# Patient Record
Sex: FEMALE | Race: OTHER | URBAN - METROPOLITAN AREA
[De-identification: names, ages, dates, MRNs, and addresses within clinical notes are randomized per-mention and may not be internally consistent; named-entity substitution may affect disease eponyms.]

---

## 2018-09-02 ENCOUNTER — INPATIENT (INPATIENT)
Facility: HOSPITAL | Age: 58
LOS: 2 days | Discharge: NURSING, REHAB OR LONG-TERM CARE | End: 2018-09-05
Attending: HOSPITALIST | Admitting: HOSPITALIST

## 2018-09-02 VITALS
DIASTOLIC BLOOD PRESSURE: 73 MMHG | SYSTOLIC BLOOD PRESSURE: 107 MMHG | RESPIRATION RATE: 17 BRPM | HEART RATE: 71 BPM | TEMPERATURE: 97 F | OXYGEN SATURATION: 99 %

## 2018-09-02 LAB
ALBUMIN SERPL ELPH-MCNC: 4.5 G/DL — SIGNIFICANT CHANGE UP (ref 3.5–5.2)
ALP SERPL-CCNC: 76 U/L — SIGNIFICANT CHANGE UP (ref 30–115)
ALT FLD-CCNC: 12 U/L — SIGNIFICANT CHANGE UP (ref 0–41)
ANION GAP SERPL CALC-SCNC: 16 MMOL/L — HIGH (ref 7–14)
APPEARANCE UR: CLEAR — SIGNIFICANT CHANGE UP
AST SERPL-CCNC: 19 U/L — SIGNIFICANT CHANGE UP (ref 0–41)
BASE EXCESS BLDV CALC-SCNC: 1.9 MMOL/L — SIGNIFICANT CHANGE UP (ref -2–2)
BASOPHILS # BLD AUTO: 0.03 K/UL — SIGNIFICANT CHANGE UP (ref 0–0.2)
BASOPHILS NFR BLD AUTO: 0.4 % — SIGNIFICANT CHANGE UP (ref 0–1)
BILIRUB SERPL-MCNC: 0.3 MG/DL — SIGNIFICANT CHANGE UP (ref 0.2–1.2)
BILIRUB UR-MCNC: NEGATIVE — SIGNIFICANT CHANGE UP
BUN SERPL-MCNC: 23 MG/DL — HIGH (ref 10–20)
CA-I SERPL-SCNC: 1.26 MMOL/L — SIGNIFICANT CHANGE UP (ref 1.12–1.3)
CALCIUM SERPL-MCNC: 9.7 MG/DL — SIGNIFICANT CHANGE UP (ref 8.5–10.1)
CHLORIDE SERPL-SCNC: 99 MMOL/L — SIGNIFICANT CHANGE UP (ref 98–110)
CO2 SERPL-SCNC: 27 MMOL/L — SIGNIFICANT CHANGE UP (ref 17–32)
COLOR SPEC: YELLOW — SIGNIFICANT CHANGE UP
CREAT SERPL-MCNC: 0.7 MG/DL — SIGNIFICANT CHANGE UP (ref 0.7–1.5)
DIFF PNL FLD: NEGATIVE — SIGNIFICANT CHANGE UP
EOSINOPHIL # BLD AUTO: 0.2 K/UL — SIGNIFICANT CHANGE UP (ref 0–0.7)
EOSINOPHIL NFR BLD AUTO: 3 % — SIGNIFICANT CHANGE UP (ref 0–8)
GAS PNL BLDV: 142 MMOL/L — SIGNIFICANT CHANGE UP (ref 136–145)
GAS PNL BLDV: SIGNIFICANT CHANGE UP
GLUCOSE SERPL-MCNC: 99 MG/DL — SIGNIFICANT CHANGE UP (ref 70–99)
GLUCOSE UR QL: NEGATIVE MG/DL — SIGNIFICANT CHANGE UP
HCO3 BLDV-SCNC: 28 MMOL/L — SIGNIFICANT CHANGE UP (ref 22–29)
HCT VFR BLD CALC: 33.6 % — LOW (ref 37–47)
HCT VFR BLDA CALC: 35.3 % — SIGNIFICANT CHANGE UP (ref 34–44)
HGB BLD CALC-MCNC: 11.5 G/DL — LOW (ref 14–18)
HGB BLD-MCNC: 12 G/DL — SIGNIFICANT CHANGE UP (ref 12–16)
IMM GRANULOCYTES NFR BLD AUTO: 0.3 % — SIGNIFICANT CHANGE UP (ref 0.1–0.3)
KETONES UR-MCNC: NEGATIVE — SIGNIFICANT CHANGE UP
LACTATE BLDV-MCNC: 0.6 MMOL/L — SIGNIFICANT CHANGE UP (ref 0.5–1.6)
LEUKOCYTE ESTERASE UR-ACNC: NEGATIVE — SIGNIFICANT CHANGE UP
LYMPHOCYTES # BLD AUTO: 2.46 K/UL — SIGNIFICANT CHANGE UP (ref 1.2–3.4)
LYMPHOCYTES # BLD AUTO: 36.6 % — SIGNIFICANT CHANGE UP (ref 20.5–51.1)
MAGNESIUM SERPL-MCNC: 1.7 MG/DL — LOW (ref 1.8–2.4)
MCHC RBC-ENTMCNC: 31.1 PG — HIGH (ref 27–31)
MCHC RBC-ENTMCNC: 35.7 G/DL — SIGNIFICANT CHANGE UP (ref 32–37)
MCV RBC AUTO: 87 FL — SIGNIFICANT CHANGE UP (ref 81–99)
MONOCYTES # BLD AUTO: 0.47 K/UL — SIGNIFICANT CHANGE UP (ref 0.1–0.6)
MONOCYTES NFR BLD AUTO: 7 % — SIGNIFICANT CHANGE UP (ref 1.7–9.3)
NEUTROPHILS # BLD AUTO: 3.55 K/UL — SIGNIFICANT CHANGE UP (ref 1.4–6.5)
NEUTROPHILS NFR BLD AUTO: 52.7 % — SIGNIFICANT CHANGE UP (ref 42.2–75.2)
NITRITE UR-MCNC: NEGATIVE — SIGNIFICANT CHANGE UP
NRBC # BLD: 0 /100 WBCS — SIGNIFICANT CHANGE UP (ref 0–0)
PCO2 BLDV: 47 MMHG — SIGNIFICANT CHANGE UP (ref 41–51)
PH BLDV: 7.38 — SIGNIFICANT CHANGE UP (ref 7.26–7.43)
PH UR: 6 — SIGNIFICANT CHANGE UP (ref 5–8)
PLATELET # BLD AUTO: 232 K/UL — SIGNIFICANT CHANGE UP (ref 130–400)
PO2 BLDV: 33 MMHG — SIGNIFICANT CHANGE UP (ref 20–40)
POTASSIUM BLDV-SCNC: 2.7 MMOL/L — LOW (ref 3.3–5.6)
POTASSIUM SERPL-MCNC: 2.6 MMOL/L — CRITICAL LOW (ref 3.5–5)
POTASSIUM SERPL-SCNC: 2.6 MMOL/L — CRITICAL LOW (ref 3.5–5)
PROT SERPL-MCNC: 7 G/DL — SIGNIFICANT CHANGE UP (ref 6–8)
PROT UR-MCNC: NEGATIVE MG/DL — SIGNIFICANT CHANGE UP
RBC # BLD: 3.86 M/UL — LOW (ref 4.2–5.4)
RBC # FLD: 12.3 % — SIGNIFICANT CHANGE UP (ref 11.5–14.5)
SAO2 % BLDV: 61 % — SIGNIFICANT CHANGE UP
SODIUM SERPL-SCNC: 142 MMOL/L — SIGNIFICANT CHANGE UP (ref 135–146)
SP GR SPEC: 1.02 — SIGNIFICANT CHANGE UP (ref 1.01–1.03)
TROPONIN T SERPL-MCNC: <0.01 NG/ML — SIGNIFICANT CHANGE UP
UROBILINOGEN FLD QL: 0.2 MG/DL — SIGNIFICANT CHANGE UP (ref 0.2–0.2)
WBC # BLD: 6.73 K/UL — SIGNIFICANT CHANGE UP (ref 4.8–10.8)
WBC # FLD AUTO: 6.73 K/UL — SIGNIFICANT CHANGE UP (ref 4.8–10.8)

## 2018-09-02 RX ORDER — MAGNESIUM SULFATE 500 MG/ML
2 VIAL (ML) INJECTION ONCE
Qty: 0 | Refills: 0 | Status: COMPLETED | OUTPATIENT
Start: 2018-09-02 | End: 2018-09-02

## 2018-09-02 RX ORDER — POTASSIUM CHLORIDE 20 MEQ
40 PACKET (EA) ORAL ONCE
Qty: 0 | Refills: 0 | Status: COMPLETED | OUTPATIENT
Start: 2018-09-02 | End: 2018-09-02

## 2018-09-02 RX ORDER — ASPIRIN/CALCIUM CARB/MAGNESIUM 324 MG
325 TABLET ORAL ONCE
Qty: 0 | Refills: 0 | Status: COMPLETED | OUTPATIENT
Start: 2018-09-02 | End: 2018-09-02

## 2018-09-02 RX ORDER — POTASSIUM CHLORIDE 20 MEQ
20 PACKET (EA) ORAL ONCE
Qty: 0 | Refills: 0 | Status: COMPLETED | OUTPATIENT
Start: 2018-09-02 | End: 2018-09-02

## 2018-09-02 RX ORDER — MAGNESIUM OXIDE 400 MG ORAL TABLET 241.3 MG
400 TABLET ORAL ONCE
Qty: 0 | Refills: 0 | Status: DISCONTINUED | OUTPATIENT
Start: 2018-09-02 | End: 2018-09-02

## 2018-09-02 RX ADMIN — Medication 325 MILLIGRAM(S): at 21:42

## 2018-09-02 RX ADMIN — Medication 40 MILLIEQUIVALENT(S): at 19:56

## 2018-09-02 RX ADMIN — Medication 50 MILLIEQUIVALENT(S): at 21:42

## 2018-09-02 RX ADMIN — Medication 50 GRAM(S): at 21:42

## 2018-09-02 NOTE — ED PROVIDER NOTE - NS ED ROS FT
Constitutional: No fever, chills.  Eyes: No visual changes.  ENT: No hearing changes. No sore throat.  Neck: No neck pain or stiffness.  Cardiovascular: + chest pain, palpitations. No edema.  Pulmonary: No SOB, cough. No hemoptysis.  Abdominal: No abdominal pain, nausea, vomiting, diarrhea.  : No dysuria, frequency.  Neuro: No syncope, dizziness. + headache.  MS: No back pain. No calf pain/swelling.  Psych: No suicidal ideations.

## 2018-09-02 NOTE — ED ADULT NURSE NOTE - NSIMPLEMENTINTERV_GEN_ALL_ED
Implemented All Universal Safety Interventions:  [place] to call system. Call bell, personal items and telephone within reach. Instruct patient to call for assistance. Room bathroom lighting operational. Non-slip footwear when patient is off stretcher. Physically safe environment: no spills, clutter or unnecessary equipment. Stretcher in lowest position, wheels locked, appropriate side rails in place.

## 2018-09-02 NOTE — ED PROVIDER NOTE - OBJECTIVE STATEMENT
Pt is a 57 y/o female with hx of HTN who presents to ED for chest pain and palpitations intermittent over the past several days. + headache that occurs in the morning. No SOB, n/v, diaphoresis.

## 2018-09-02 NOTE — ED PROVIDER NOTE - MEDICAL DECISION MAKING DETAILS
Patient was signed out to me pending repeat potassium level/EKG and further evaluation of chest pain/palpitations, labs noted, repeat potassium on VBG is 2.7, supplementation started, and patient is admitted to Tele. Patient was signed out to me pending repeat potassium level/EKG and further evaluation of chest pain/palpitations, labs noted, repeat potassium on VBG is 2.7, supplementation started, discussed with hosptialist and patient is admitted to Tele.

## 2018-09-02 NOTE — ED ADULT NURSE NOTE - OBJECTIVE STATEMENT
pt came to the ER today walk in with c/o chest pain. denies SOB dizziness or nausea at this time. pt in no distress.

## 2018-09-02 NOTE — ED PROVIDER NOTE - PROGRESS NOTE DETAILS
I personally evaluated the patient. I reviewed the Resident’s note (as assigned above), and agree with the findings and plan except as documented in my note.   59 y/o F Polish speaking, spoke with her in Polish, presents with dizziness, palpitations and CP. Pt notes that she has a HX of HTN and has been having a HA on and off for the past 5 months. Over the last week, pt has been getting very dizzy with sensations of room spinning, also had blurry vision 1 day last week with dizziness. Pt has been having palpitation and CP x1 week. No nausea, vomiting, fever/chills or cough. No trauma. Denies leg pain or swelling. Denies having similar SX in the past. Denies hx of MI or stroke. Pt is a non-smoker. Today tried some aspirin with minimal relief. Pt is also concerned about her anti-HTN medicine, Losartan-HCTZ, since she received a letter from [employer] stating that the compound in her medication was possibly in an adverse reaction and that she needed to get a different medication from the pharmacy. Pt has been taking the same medication and has not switched it since receiving the letter, but has been nervous in doing so. Since her doctor has been away for a month pt has not been able to follow up with him but has an appointment on September 5th . Currently pt is asymptomatic except for palpitations. On exam pt is WA, non-toxic. Head NCAT, EOMI, PERRL, mucosa moist, neck supple. Lungs CTA. S1S2. Abdomen soft NTND. Extremities C/C/E. Neuro- CN II-XII intact, UE and LE strength and sensations intact, no cerebellar signs, normal gait. A/P: Will get labs, cardiac enzymes, head CT and reassess. signed out to Dr. Arevalo, repeat ekg and K at 11pm, if wnl pt can be in obs for r/ o acs

## 2018-09-03 DIAGNOSIS — Z98.82 BREAST IMPLANT STATUS: Chronic | ICD-10-CM

## 2018-09-03 LAB
ANION GAP SERPL CALC-SCNC: 11 MMOL/L — SIGNIFICANT CHANGE UP (ref 7–14)
ANION GAP SERPL CALC-SCNC: 13 MMOL/L — SIGNIFICANT CHANGE UP (ref 7–14)
BUN SERPL-MCNC: 17 MG/DL — SIGNIFICANT CHANGE UP (ref 10–20)
BUN SERPL-MCNC: 23 MG/DL — HIGH (ref 10–20)
CALCIUM SERPL-MCNC: 8.8 MG/DL — SIGNIFICANT CHANGE UP (ref 8.5–10.1)
CALCIUM SERPL-MCNC: 9.3 MG/DL — SIGNIFICANT CHANGE UP (ref 8.5–10.1)
CHLORIDE SERPL-SCNC: 105 MMOL/L — SIGNIFICANT CHANGE UP (ref 98–110)
CHLORIDE SERPL-SCNC: 109 MMOL/L — SIGNIFICANT CHANGE UP (ref 98–110)
CK MB CFR SERPL CALC: 3.1 NG/ML — SIGNIFICANT CHANGE UP (ref 0.6–6.3)
CK SERPL-CCNC: 142 U/L — SIGNIFICANT CHANGE UP (ref 0–225)
CO2 SERPL-SCNC: 22 MMOL/L — SIGNIFICANT CHANGE UP (ref 17–32)
CO2 SERPL-SCNC: 24 MMOL/L — SIGNIFICANT CHANGE UP (ref 17–32)
CREAT SERPL-MCNC: 0.5 MG/DL — LOW (ref 0.7–1.5)
CREAT SERPL-MCNC: 0.6 MG/DL — LOW (ref 0.7–1.5)
GLUCOSE SERPL-MCNC: 84 MG/DL — SIGNIFICANT CHANGE UP (ref 70–99)
GLUCOSE SERPL-MCNC: 93 MG/DL — SIGNIFICANT CHANGE UP (ref 70–99)
MAGNESIUM SERPL-MCNC: 1.9 MG/DL — SIGNIFICANT CHANGE UP (ref 1.8–2.4)
POTASSIUM SERPL-MCNC: 3 MMOL/L — LOW (ref 3.5–5)
POTASSIUM SERPL-MCNC: 3.5 MMOL/L — SIGNIFICANT CHANGE UP (ref 3.5–5)
POTASSIUM SERPL-SCNC: 3 MMOL/L — LOW (ref 3.5–5)
POTASSIUM SERPL-SCNC: 3.5 MMOL/L — SIGNIFICANT CHANGE UP (ref 3.5–5)
SODIUM SERPL-SCNC: 142 MMOL/L — SIGNIFICANT CHANGE UP (ref 135–146)
SODIUM SERPL-SCNC: 142 MMOL/L — SIGNIFICANT CHANGE UP (ref 135–146)
TROPONIN T SERPL-MCNC: <0.01 NG/ML — SIGNIFICANT CHANGE UP
TSH SERPL-MCNC: 1.1 UIU/ML — SIGNIFICANT CHANGE UP (ref 0.27–4.2)

## 2018-09-03 RX ORDER — ACETAMINOPHEN 500 MG
650 TABLET ORAL EVERY 4 HOURS
Qty: 0 | Refills: 0 | Status: DISCONTINUED | OUTPATIENT
Start: 2018-09-03 | End: 2018-09-05

## 2018-09-03 RX ORDER — ENOXAPARIN SODIUM 100 MG/ML
40 INJECTION SUBCUTANEOUS DAILY
Qty: 0 | Refills: 0 | Status: DISCONTINUED | OUTPATIENT
Start: 2018-09-03 | End: 2018-09-05

## 2018-09-03 RX ORDER — LOSARTAN POTASSIUM 100 MG/1
100 TABLET, FILM COATED ORAL DAILY
Qty: 0 | Refills: 0 | Status: DISCONTINUED | OUTPATIENT
Start: 2018-09-03 | End: 2018-09-05

## 2018-09-03 RX ORDER — ASPIRIN/CALCIUM CARB/MAGNESIUM 324 MG
81 TABLET ORAL DAILY
Qty: 0 | Refills: 0 | Status: DISCONTINUED | OUTPATIENT
Start: 2018-09-03 | End: 2018-09-05

## 2018-09-03 RX ADMIN — LOSARTAN POTASSIUM 100 MILLIGRAM(S): 100 TABLET, FILM COATED ORAL at 05:53

## 2018-09-03 RX ADMIN — Medication 50 MILLIEQUIVALENT(S): at 00:13

## 2018-09-03 RX ADMIN — Medication 40 MILLIEQUIVALENT(S): at 00:13

## 2018-09-03 RX ADMIN — Medication 81 MILLIGRAM(S): at 11:58

## 2018-09-03 NOTE — H&P ADULT - NSHPSOCIALHISTORY_GEN_ALL_CORE
Marital Status:  ( x  )    (   ) Single    (   )    (  )   Lives with: (  ) alone  (  ) children   ( x ) spouse   (  ) parents  (  ) other    Substance Use (street drugs): ( x ) never used  (  ) other:  Tobacco Usage:  (  x ) never smoked   (   ) former smoker   (   ) current smoker  (     ) pack year  Alcohol Usage: Denied

## 2018-09-03 NOTE — H&P ADULT - NSHPPHYSICALEXAM_GEN_ALL_CORE
Vital Signs Last 24 Hrs  T(C): 36.1 (02 Sep 2018 23:28), Max: 36.3 (02 Sep 2018 18:45)  T(F): 97 (02 Sep 2018 23:28), Max: 97.3 (02 Sep 2018 18:45)  HR: 72 (02 Sep 2018 23:28) (71 - 75)  BP: 107/63 (02 Sep 2018 23:28) (107/63 - 131/78)  RR: 18 (02 Sep 2018 23:28) (17 - 18)  SpO2: 100% (02 Sep 2018 23:28) (99% - 100%)      GENERAL: NAD, well-developed, Non-toxic, stated age   HEAD:  Atraumatic, Normocephalic  EYES: EOMI, conjunctiva and sclera clear  NECK: Supple, no thyroid enlargement, tenderness to palpation, or nodules appreciated   CHEST/LUNG: Clear to auscultation bilaterally; No wheezing, rhonchi, or crackles  HEART: Regular rate and rhythm; s1, s2, No murmurs, rubs, or gallops  ABDOMEN: Soft, Nontender, Nondistended; Bowel sounds present, No rebound or guarding noted   EXTREMITIES:  No lower extremity edema or calf tenderness to palpation.  No clubbing or cyanosis  PSYCH: AAOx3  NEUROLOGY: non-focal  SKIN: No rashes or lesions on exposed areas

## 2018-09-03 NOTE — H&P ADULT - ASSESSMENT
58F with pmhx of HTN presents for palpitations, dizziness and headache in the setting of weight loss for 1 week.    Palpitations, Headache, Dizziness, and mild weight loss: Hypokalemia induced arrhythmia vs Hyperthyroidism vs pheochromocytoma vs Anxiety vs paroxsysmal A-Fib  Exact etiology unclear at this time   Check 2D Echo, and repeat cardiac enzymes   Potassium repleted, recheck levels in morning as well as magnesium   Check TSH. Likely to benefit from urine and plasma metanephrines as an out patient.   Continue with tele monitoring for 24 hours   May require outpatient holter monitor or loop recorder    HTN: Currently controlled   Continue with ARB (changed to losartan from valsartan. There is a shortage of valsartan which may be the "issue" with her medication)  Holding HCTZ due to the hypokalemia. Would likely not restart, can try low dose CCB if extra control is needed     DVT ppx: Lovenox  GI ppx: Not indicated  Diet: DASH  Full Code 58F with pmhx of HTN presents for palpitations, dizziness and headache in the setting of weight loss for 1 week.    Palpitations, Headache, Dizziness, and mild weight loss: Hypokalemia induced arrhythmia vs Hyperthyroidism vs pheochromocytoma vs Anxiety vs paroxsysmal A-Fib  Exact etiology unclear at this time   Check 2D Echo, and repeat cardiac enzymes   Potassium repleted, recheck levels in morning as well as magnesium   Check TSH. Likely to benefit from urine and plasma metanephrines as an out patient.   Continue with tele monitoring for 24 hours   May require outpatient holter monitor or loop recorder  Will hold off on Cardio/EPS consultation unless there if an abnormality on lab work/imaging. If all normal can be given referral upon discharge      HTN: Currently controlled   Continue with ARB (changed to losartan from valsartan. There is a shortage of valsartan which may be the "issue" with her medication)  Holding HCTZ due to the hypokalemia. Would likely not restart, can try low dose CCB if extra control is needed     DVT ppx: Lovenox  GI ppx: Not indicated  Diet: DASH  Full Code

## 2018-09-03 NOTE — H&P ADULT - HISTORY OF PRESENT ILLNESS
58F with pmhx of HTN presents for palpitations for 1 week. Patient states the palpitations have been almost constant for the last week with worsening intensity which is what prompted her to come to the ED. She also claims that for the last three months she has been experiencing daily morning headaches, and the palpitations have been associated with dizziness as well. Patient also reports at least a 3lbs unintentional weight loss in the last couple weeks, but states that her cloths are much looser fitting than normal. She actively tries to eat more to prevent losing weight. Patient denies chest pain, anxiousness/nervousness/stress prior to the onset of the palpitations, Shortness of breath, diarrhea, abdominal pain, diaphoresis, and family history of cancer or thyroid issues.   Patient also states that [facility] had called her and told her there was an issue with her medication, (possible contamination?) but she has been continuing to take it.     In ED: HR: 71 (highest was 98 during examination, sinus on tele), BP: 107/73, T: 97.3  Potassium was noted to be 2.6, Given 40meq and 20 meq IV KCl and repeat was 3.0, repleted again with 40 po and 20 IV  EKG Sinus rhythm, normal rate   Trop negative

## 2018-09-03 NOTE — H&P ADULT - NSHPLABSRESULTS_GEN_ALL_CORE
12.0   6.73  )-----------( 232      ( 02 Sep 2018 17:51 )             33.6           142  |  105  |  23<H>  ----------------------------<  93  3.0<L>   |  24  |  0.6<L>    Ca    9.3      02 Sep 2018 22:35  Mg     1.7         TPro  7.0  /  Alb  4.5  /  TBili  0.3  /  DBili  x   /  AST  19  /  ALT  12  /  AlkPhos  76            Urinalysis Basic - ( 02 Sep 2018 17:51 )    Color: Yellow / Appearance: Clear / S.020 / pH: x  Gluc: x / Ketone: Negative  / Bili: Negative / Urobili: 0.2 mg/dL   Blood: x / Protein: Negative mg/dL / Nitrite: Negative   Leuk Esterase: Negative / RBC: x / WBC x   Sq Epi: x / Non Sq Epi: x / Bacteria: x      Lactate Trend      CARDIAC MARKERS ( 02 Sep 2018 17:51 )  x     / <0.01 ng/mL / x     / x     / x

## 2018-09-03 NOTE — H&P ADULT - ATTENDING COMMENTS
58 yr old presented with palpitations, dizziness and weight loss  VITAL SIGNS (Last 24 hrs):  T(C): 36.4 (09-03-18 @ 07:48), Max: 36.4 (09-03-18 @ 07:48)  HR: 71 (09-03-18 @ 07:48) (71 - 76)  BP: 115/65 (09-03-18 @ 07:48) (107/63 - 131/78)  RR: 17 (09-03-18 @ 07:48) (17 - 18)  SpO2: 99% (09-03-18 @ 07:48) (99% - 100%)  Wt(kg): --  Daily     Daily     I&O's Summary                        12.0   6.73  )-----------( 232      ( 02 Sep 2018 17:51 )             33.6   09-03    142  |  109  |  17  ----------------------------<  84  3.5   |  22  |  0.5<L>    Ca    8.8      03 Sep 2018 05:30  Mg     1.9     09-03    TPro  7.0  /  Alb  4.5  /  TBili  0.3  /  DBili  x   /  AST  19  /  ALT  12  /  AlkPhos  76  09-02  ekg-rate 72/min with incomplete RBBB 58 yr old Female presented with palpitations, dizziness and weight loss  VITAL SIGNS (Last 24 hrs):  T(C): 36.4 (09-03-18 @ 07:48), Max: 36.4 (09-03-18 @ 07:48)  HR: 71 (09-03-18 @ 07:48) (71 - 76)  BP: 115/65 (09-03-18 @ 07:48) (107/63 - 131/78)  RR: 17 (09-03-18 @ 07:48) (17 - 18)  SpO2: 99% (09-03-18 @ 07:48) (99% - 100%)  Wt(kg): --  Daily     Daily     I&O's Summary                        12.0   6.73  )-----------( 232      ( 02 Sep 2018 17:51 )             33.6   09-03    142  |  109  |  17  ----------------------------<  84  3.5   |  22  |  0.5<L>    Ca    8.8      03 Sep 2018 05:30  Mg     1.9     09-03    TPro  7.0  /  Alb  4.5  /  TBili  0.3  /  DBili  x   /  AST  19  /  ALT  12  /  AlkPhos  76  09-02  ekg-rate 72/min with incomplete RBBB  o/e  aaox3  chest-b/l air entry  cvs-s1s2n  abd/gi-soft, bs+  no edema  cns- no deficit  ASSESSMENT AND PLAN:  # Palpitations- telemetry monitoring, cardiac enzymes were negative and echo is normal.  TSH pending  # Weight loss-she has been getting mammograms and pap smears regularly  # Generalized weakness could be sec to hypokalemia  # HTN- her medication valsartan/HCTZ was recalled due to presence of valsartan--  on losartan also for now if BP is High can be changed to amlodipine  # Hypokalemia- due to excess use of diuretics --she sometimes takes 2 pills

## 2018-09-04 LAB
ALBUMIN SERPL ELPH-MCNC: 3.9 G/DL — SIGNIFICANT CHANGE UP (ref 3.5–5.2)
ALP SERPL-CCNC: 63 U/L — SIGNIFICANT CHANGE UP (ref 30–115)
ALT FLD-CCNC: 12 U/L — SIGNIFICANT CHANGE UP (ref 0–41)
ANION GAP SERPL CALC-SCNC: 13 MMOL/L — SIGNIFICANT CHANGE UP (ref 7–14)
AST SERPL-CCNC: 20 U/L — SIGNIFICANT CHANGE UP (ref 0–41)
BILIRUB SERPL-MCNC: 0.5 MG/DL — SIGNIFICANT CHANGE UP (ref 0.2–1.2)
BUN SERPL-MCNC: 16 MG/DL — SIGNIFICANT CHANGE UP (ref 10–20)
CALCIUM SERPL-MCNC: 9.2 MG/DL — SIGNIFICANT CHANGE UP (ref 8.5–10.1)
CHLORIDE SERPL-SCNC: 108 MMOL/L — SIGNIFICANT CHANGE UP (ref 98–110)
CO2 SERPL-SCNC: 27 MMOL/L — SIGNIFICANT CHANGE UP (ref 17–32)
CREAT SERPL-MCNC: 0.6 MG/DL — LOW (ref 0.7–1.5)
GLUCOSE SERPL-MCNC: 84 MG/DL — SIGNIFICANT CHANGE UP (ref 70–99)
HCT VFR BLD CALC: 34.1 % — LOW (ref 37–47)
HGB BLD-MCNC: 11.8 G/DL — LOW (ref 12–16)
MAGNESIUM SERPL-MCNC: 1.9 MG/DL — SIGNIFICANT CHANGE UP (ref 1.8–2.4)
MCHC RBC-ENTMCNC: 30.6 PG — SIGNIFICANT CHANGE UP (ref 27–31)
MCHC RBC-ENTMCNC: 34.6 G/DL — SIGNIFICANT CHANGE UP (ref 32–37)
MCV RBC AUTO: 88.3 FL — SIGNIFICANT CHANGE UP (ref 81–99)
NRBC # BLD: 0 /100 WBCS — SIGNIFICANT CHANGE UP (ref 0–0)
PLATELET # BLD AUTO: 228 K/UL — SIGNIFICANT CHANGE UP (ref 130–400)
POTASSIUM SERPL-MCNC: 3.3 MMOL/L — LOW (ref 3.5–5)
POTASSIUM SERPL-SCNC: 3.3 MMOL/L — LOW (ref 3.5–5)
PROT SERPL-MCNC: 6.2 G/DL — SIGNIFICANT CHANGE UP (ref 6–8)
RBC # BLD: 3.86 M/UL — LOW (ref 4.2–5.4)
RBC # FLD: 12.5 % — SIGNIFICANT CHANGE UP (ref 11.5–14.5)
SODIUM SERPL-SCNC: 148 MMOL/L — HIGH (ref 135–146)
WBC # BLD: 5.62 K/UL — SIGNIFICANT CHANGE UP (ref 4.8–10.8)
WBC # FLD AUTO: 5.62 K/UL — SIGNIFICANT CHANGE UP (ref 4.8–10.8)

## 2018-09-04 RX ORDER — POTASSIUM CHLORIDE 20 MEQ
40 PACKET (EA) ORAL EVERY 12 HOURS
Qty: 0 | Refills: 0 | Status: DISCONTINUED | OUTPATIENT
Start: 2018-09-04 | End: 2018-09-05

## 2018-09-04 RX ORDER — SODIUM CHLORIDE 9 MG/ML
1000 INJECTION, SOLUTION INTRAVENOUS
Qty: 0 | Refills: 0 | Status: DISCONTINUED | OUTPATIENT
Start: 2018-09-04 | End: 2018-09-05

## 2018-09-04 RX ADMIN — SODIUM CHLORIDE 75 MILLILITER(S): 9 INJECTION, SOLUTION INTRAVENOUS at 17:33

## 2018-09-04 RX ADMIN — Medication 40 MILLIEQUIVALENT(S): at 17:34

## 2018-09-04 RX ADMIN — Medication 81 MILLIGRAM(S): at 12:22

## 2018-09-04 RX ADMIN — LOSARTAN POTASSIUM 100 MILLIGRAM(S): 100 TABLET, FILM COATED ORAL at 05:22

## 2018-09-04 NOTE — PROGRESS NOTE ADULT - SUBJECTIVE AND OBJECTIVE BOX
Patient is a 58y old  Female who presents with a chief complaint of Palpitations and dizziness (04 Sep 2018 16:41)      PAST MEDICAL & SURGICAL HISTORY:  Hypertension  History of breast implant      MEDICATIONS  (STANDING):  aspirin  chewable 81 milliGRAM(s) Oral daily  enoxaparin Injectable 40 milliGRAM(s) SubCutaneous daily  losartan 100 milliGRAM(s) Oral daily  potassium chloride    Tablet ER 40 milliEquivalent(s) Oral every 12 hours  sodium chloride 0.45%. 1000 milliLiter(s) (75 mL/Hr) IV Continuous <Continuous>    MEDICATIONS  (PRN):  acetaminophen   Tablet. 650 milliGRAM(s) Oral every 4 hours PRN Mild Pain (1 - 3)      Overnight events:    Vital Signs Last 24 Hrs  T(C): 37.1 (04 Sep 2018 15:48), Max: 37.1 (04 Sep 2018 15:48)  T(F): 98.8 (04 Sep 2018 15:48), Max: 98.8 (04 Sep 2018 15:48)  HR: 63 (04 Sep 2018 15:48) (63 - 84)  BP: 115/65 (04 Sep 2018 15:48) (101/66 - 115/65)  BP(mean): --  RR: 18 (04 Sep 2018 15:48) (18 - 18)  SpO2: 100% (04 Sep 2018 15:48) (99% - 100%)  CAPILLARY BLOOD GLUCOSE        I&O's Summary      Physical Exam:    General : NAD  Neck: Soft and supple, No JVD  Respiratory:cta b/l   Cardiovascular: S1 and S2, regular rate and rhythm, no Murmurs, gallops or rubs  Gastrointestinal: soft, NT ,+BS  Extremities: No peripheral edema        Labs:                        11.8   5.62  )-----------( 228      ( 04 Sep 2018 08:23 )             34.1             09-04    148<H>  |  108  |  16  ----------------------------<  84  3.3<L>   |  27  |  0.6<L>    Ca    9.2      04 Sep 2018 08:23  Mg     1.9     09-04    TPro  6.2  /  Alb  3.9  /  TBili  0.5  /  DBili  x   /  AST  20  /  ALT  12  /  AlkPhos  63  09-04    LIVER FUNCTIONS - ( 04 Sep 2018 08:23 )  Alb: 3.9 g/dL / Pro: 6.2 g/dL / ALK PHOS: 63 U/L / ALT: 12 U/L / AST: 20 U/L / GGT: x                   CARDIAC MARKERS ( 03 Sep 2018 05:30 )  x     / <0.01 ng/mL / 142 U/L / x     / 3.1 ng/mL

## 2018-09-04 NOTE — PROGRESS NOTE ADULT - SUBJECTIVE AND OBJECTIVE BOX
S : No palpitations since admission  No CP/SOB      All other pertinent ROS negative.      Vital Signs Last 24 Hrs  T(C): 37.1 (04 Sep 2018 15:48), Max: 37.1 (04 Sep 2018 15:48)  T(F): 98.8 (04 Sep 2018 15:48), Max: 98.8 (04 Sep 2018 15:48)  HR: 63 (04 Sep 2018 15:48) (63 - 84)  BP: 115/65 (04 Sep 2018 15:48) (101/66 - 115/65)  BP(mean): --  RR: 18 (04 Sep 2018 15:48) (18 - 18)  SpO2: 100% (04 Sep 2018 15:48) (99% - 100%)  PHYSICAL EXAM:    Constitutional: NAD, awake and alert, well-developed  HEENT: PERR, EOMI, Normal Hearing, MMM  Neck: Soft and supple, No LAD, No JVD  Respiratory: Breath sounds are clear bilaterally, No wheezing, rales or rhonchi  Cardiovascular: S1 and S2, regular rate and rhythm, no Murmurs, gallops or rubs  Gastrointestinal: Bowel Sounds present, soft, nontender, nondistended, no guarding, no rebound  Extremities: No peripheral edema    MEDICATIONS:  MEDICATIONS  (STANDING):  aspirin  chewable 81 milliGRAM(s) Oral daily  enoxaparin Injectable 40 milliGRAM(s) SubCutaneous daily  losartan 100 milliGRAM(s) Oral daily  potassium chloride    Tablet ER 40 milliEquivalent(s) Oral every 12 hours  sodium chloride 0.45%. 1000 milliLiter(s) (75 mL/Hr) IV Continuous <Continuous>      LABS: All Labs Reviewed:                        11.8   5.62  )-----------( 228      ( 04 Sep 2018 08:23 )             34.1     09-04    148<H>  |  108  |  16  ----------------------------<  84  3.3<L>   |  27  |  0.6<L>    Ca    9.2      04 Sep 2018 08:23  Mg     1.9     09-04    TPro  6.2  /  Alb  3.9  /  TBili  0.5  /  DBili  x   /  AST  20  /  ALT  12  /  AlkPhos  63  09-04      CARDIAC MARKERS ( 03 Sep 2018 05:30 )  x     / <0.01 ng/mL / 142 U/L / x     / 3.1 ng/mL  CARDIAC MARKERS ( 02 Sep 2018 17:51 )  x     / <0.01 ng/mL / x     / x     / x          Blood Culture:     Radiology: reviewed

## 2018-09-04 NOTE — PROGRESS NOTE ADULT - ASSESSMENT
58F with pmhx of HTN presents for palpitations, dizziness and headache in the setting of weight loss for 1 week.    #Palpitations: Happen infrequently. None in the 2 days she was here. Echo, Tele normal so far. Possibly d/t dehydration from the extra pills of diuretic she took sometimes ?   TSH normal.     # Hypokalemia : Recurred  #Hypernatremia : ?Dehydration  f/u urine lytes to rule out  RTA      1/2 NS @ 75  PO KCl 40 x 2.       May require outpatient Cardio eval for holter monitor or loop recorder  Will hold off on Cardio/EPS consultation unless there if an abnormality on lab work/imaging. If all normal can be given referral upon discharge      HTN: Currently controlled   Continue with ARB (changed to losartan from valsartan, since there was a recall on valsartan ? )  Holding HCTZ due to the hypokalemia. Would likely not restart, can try low dose CCB if extra control is needed     DVT ppx: Lovenox  GI ppx: Not indicated  Diet: DASH  Full Code

## 2018-09-04 NOTE — PROGRESS NOTE ADULT - ASSESSMENT
58F with pmhx of HTN presents for palpitations, dizziness and headache in the setting of weight loss for 1 week.    #Palpitations: Happen infrequently. None in the 2 days she was here. Echo, Tele normal so far. Possibly d/t dehydration from the extra pills of diuretic she took sometimes ?   TSH normal.     # Hypokalemia : Recurred  #Hypernatremia : ?Dehydration  Will check Urine lytes to rule out any rare kidney disorders that might be leading to K loss in urine / RTA ?     1/2 NS @ 75  PO KCl 40 x 2.       May require outpatient Cardio eval for holter monitor or loop recorder  Will hold off on Cardio/EPS consultation unless there if an abnormality on lab work/imaging. If all normal can be given referral upon discharge      HTN: Currently controlled   Continue with ARB (changed to losartan from valsartan, since there was a recall on valsartan ? )  Holding HCTZ due to the hypokalemia. Would likely not restart, can try low dose CCB if extra control is needed     DVT ppx: Lovenox  GI ppx: Not indicated  Diet: DASH  Full Code

## 2018-09-05 ENCOUNTER — TRANSCRIPTION ENCOUNTER (OUTPATIENT)
Age: 58
End: 2018-09-05

## 2018-09-05 VITALS
TEMPERATURE: 97 F | RESPIRATION RATE: 18 BRPM | OXYGEN SATURATION: 100 % | DIASTOLIC BLOOD PRESSURE: 78 MMHG | HEART RATE: 77 BPM | SYSTOLIC BLOOD PRESSURE: 101 MMHG

## 2018-09-05 LAB
ALBUMIN SERPL ELPH-MCNC: 4.1 G/DL — SIGNIFICANT CHANGE UP (ref 3.5–5.2)
ALP SERPL-CCNC: 64 U/L — SIGNIFICANT CHANGE UP (ref 30–115)
ALT FLD-CCNC: 12 U/L — SIGNIFICANT CHANGE UP (ref 0–41)
ANION GAP SERPL CALC-SCNC: 12 MMOL/L — SIGNIFICANT CHANGE UP (ref 7–14)
AST SERPL-CCNC: 21 U/L — SIGNIFICANT CHANGE UP (ref 0–41)
BILIRUB SERPL-MCNC: 0.5 MG/DL — SIGNIFICANT CHANGE UP (ref 0.2–1.2)
BUN SERPL-MCNC: 12 MG/DL — SIGNIFICANT CHANGE UP (ref 10–20)
CALCIUM SERPL-MCNC: 9.3 MG/DL — SIGNIFICANT CHANGE UP (ref 8.5–10.1)
CHLORIDE SERPL-SCNC: 107 MMOL/L — SIGNIFICANT CHANGE UP (ref 98–110)
CO2 SERPL-SCNC: 25 MMOL/L — SIGNIFICANT CHANGE UP (ref 17–32)
CREAT ?TM UR-MCNC: 120 MG/DL — SIGNIFICANT CHANGE UP
CREAT SERPL-MCNC: 0.6 MG/DL — LOW (ref 0.7–1.5)
GLUCOSE SERPL-MCNC: 79 MG/DL — SIGNIFICANT CHANGE UP (ref 70–99)
HCT VFR BLD CALC: 37.2 % — SIGNIFICANT CHANGE UP (ref 37–47)
HGB BLD-MCNC: 12.8 G/DL — SIGNIFICANT CHANGE UP (ref 12–16)
MAGNESIUM SERPL-MCNC: 1.9 MG/DL — SIGNIFICANT CHANGE UP (ref 1.8–2.4)
MCHC RBC-ENTMCNC: 30.5 PG — SIGNIFICANT CHANGE UP (ref 27–31)
MCHC RBC-ENTMCNC: 34.4 G/DL — SIGNIFICANT CHANGE UP (ref 32–37)
MCV RBC AUTO: 88.8 FL — SIGNIFICANT CHANGE UP (ref 81–99)
NRBC # BLD: 0 /100 WBCS — SIGNIFICANT CHANGE UP (ref 0–0)
PLATELET # BLD AUTO: 224 K/UL — SIGNIFICANT CHANGE UP (ref 130–400)
POTASSIUM SERPL-MCNC: 4.2 MMOL/L — SIGNIFICANT CHANGE UP (ref 3.5–5)
POTASSIUM SERPL-SCNC: 4.2 MMOL/L — SIGNIFICANT CHANGE UP (ref 3.5–5)
POTASSIUM UR-SCNC: 27 MMOL/L — SIGNIFICANT CHANGE UP
PROT SERPL-MCNC: 6.5 G/DL — SIGNIFICANT CHANGE UP (ref 6–8)
RBC # BLD: 4.19 M/UL — LOW (ref 4.2–5.4)
RBC # FLD: 12.4 % — SIGNIFICANT CHANGE UP (ref 11.5–14.5)
SODIUM SERPL-SCNC: 144 MMOL/L — SIGNIFICANT CHANGE UP (ref 135–146)
SODIUM UR-SCNC: 109 MMOL/L — SIGNIFICANT CHANGE UP
WBC # BLD: 6.55 K/UL — SIGNIFICANT CHANGE UP (ref 4.8–10.8)
WBC # FLD AUTO: 6.55 K/UL — SIGNIFICANT CHANGE UP (ref 4.8–10.8)

## 2018-09-05 RX ORDER — ASPIRIN/CALCIUM CARB/MAGNESIUM 324 MG
1 TABLET ORAL
Qty: 0 | Refills: 0 | DISCHARGE
Start: 2018-09-05

## 2018-09-05 RX ADMIN — Medication 40 MILLIEQUIVALENT(S): at 06:06

## 2018-09-05 RX ADMIN — Medication 650 MILLIGRAM(S): at 04:03

## 2018-09-05 RX ADMIN — ENOXAPARIN SODIUM 40 MILLIGRAM(S): 100 INJECTION SUBCUTANEOUS at 11:11

## 2018-09-05 RX ADMIN — Medication 81 MILLIGRAM(S): at 11:11

## 2018-09-05 RX ADMIN — LOSARTAN POTASSIUM 100 MILLIGRAM(S): 100 TABLET, FILM COATED ORAL at 06:06

## 2018-09-05 RX ADMIN — Medication 650 MILLIGRAM(S): at 05:02

## 2018-09-05 NOTE — PROGRESS NOTE ADULT - ASSESSMENT
# Palpitations- telemetry monitoring, cardiac enzymes were negative and echo is normal.  TSH was normal  # Weight loss-she has been getting mammograms and pap smears regularly  # Generalized weakness could be sec to hypokalemia  # HTN- her medication valsartan/HCTZ was recalled due to presence of valsartan--  does not need one as BP on lower end  # Hypokalemia- due to excess use of diuretics --she sometimes takes 2 pills. Now resolved.  DC home today-spent more than 30mins .

## 2018-09-05 NOTE — DISCHARGE NOTE ADULT - PLAN OF CARE
resolution hypokalemia treated with supplements in hospital  hold antihypertensive medications for now  follow up with primary care doctor within one week

## 2018-09-05 NOTE — DISCHARGE NOTE ADULT - CARE PLAN
Principal Discharge DX:	Palpitation  Goal:	resolution  Assessment and plan of treatment:	hypokalemia treated with supplements in hospital  hold antihypertensive medications for now  follow up with primary care doctor within one week

## 2018-09-05 NOTE — DISCHARGE NOTE ADULT - PATIENT PORTAL LINK FT
You can access the [facility], offered by [facility], by registering with the following website: [email or web address]

## 2018-09-05 NOTE — ED ADULT NURSE REASSESSMENT NOTE - NS ED NURSE REASSESS COMMENT FT1
patient reassessed, observed resting in bed. no indication of pain or discomfort. VS reassessed, VS wnl. morning meds administered as ordered. patient observed ambulating to the restroom with a steady gait. plan of care reviewed with patient, patient is admitted awaiting inpatient bed.
patient received from previous RN. patient is aox3. no indication of pain or discomfort. patient on cardiac monitor, observed ambulating independently to the restroom. plan of care reviewed with patient, patient is awaiting in patient bed.
patient resting in bed. All due care rendered. All medications given as ordered. No acute distress. Cardiac monitor in place. IV patent and intact. Ct of head to be done. Will continue to montior

## 2018-09-05 NOTE — PROGRESS NOTE ADULT - SUBJECTIVE AND OBJECTIVE BOX
SUBJECTIVE:    Patient is a 58y old Female who presents with a chief complaint of Palpitations and dizziness (04 Sep 2018 18:30)    Currently admitted to medicine with the primary diagnosis of Hypokalemia     Today is hospital day 3d. This morning she is resting comfortably in bed and reports no new issues or overnight events.     PAST MEDICAL & SURGICAL HISTORY  Hypertension  History of breast implant    SOCIAL HISTORY:  Negative for smoking/alcohol/drug use.     ALLERGIES:  No Known Allergies    MEDICATIONS:  STANDING MEDICATIONS  aspirin  chewable 81 milliGRAM(s) Oral daily  enoxaparin Injectable 40 milliGRAM(s) SubCutaneous daily  losartan 100 milliGRAM(s) Oral daily  potassium chloride    Tablet ER 40 milliEquivalent(s) Oral every 12 hours  sodium chloride 0.45%. 1000 milliLiter(s) IV Continuous <Continuous>    PRN MEDICATIONS  acetaminophen   Tablet. 650 milliGRAM(s) Oral every 4 hours PRN    VITALS:   T(F): 97  HR: 77  BP: 101/78  RR: 18  SpO2: 100%    LABS:                        12.8   6.55  )-----------( 224      ( 05 Sep 2018 08:01 )             37.2     09-05    144  |  107  |  12  ----------------------------<  79  4.2   |  25  |  0.6<L>    Ca    9.3      05 Sep 2018 08:01  Mg     1.9     09-05    TPro  6.5  /  Alb  4.1  /  TBili  0.5  /  DBili  x   /  AST  21  /  ALT  12  /  AlkPhos  64  09-05                  RADIOLOGY:    PHYSICAL EXAM:  GEN: No acute distress  LUNGS: Clear to auscultation bilaterally   HEART: S1/S2 present. RRR.   ABD/ GI: Soft, non-tender, non-distended. Bowel sounds present  EXT: NC/NC/NE/2+PP/MUNOZ  NEURO: AAOX3

## 2018-09-05 NOTE — DISCHARGE NOTE ADULT - HOSPITAL COURSE
Admitted for palpitations going on for one week.    Potassium was 2.6 on admission, supplements given, 4.2 upon discharge   cardiac enzymes were negative   no events on telemetry  echo normal (EF 55%)  CT head negative for acute intracranial pathology  BP on the low side in hospital 101/78 upon discharge (antihypertensive medications held)  follow up with primary care doctor in one week to review blood pressure medications and potassium level

## 2018-09-05 NOTE — DISCHARGE NOTE ADULT - MEDICATION SUMMARY - MEDICATIONS TO TAKE
I will START or STAY ON the medications listed below when I get home from the hospital:    aspirin 81 mg oral tablet, chewable  -- 1 tab(s) by mouth once a day  -- Indication: For Cardioprotection I will START or STAY ON the medications listed below when I get home from the hospital:    aspirin 81 mg oral tablet, chewable  -- 1 tab(s) by mouth once a day  -- Indication: For cardioprotection

## 2018-09-07 DIAGNOSIS — E87.6 HYPOKALEMIA: ICD-10-CM

## 2018-09-07 DIAGNOSIS — I10 ESSENTIAL (PRIMARY) HYPERTENSION: ICD-10-CM

## 2018-09-07 DIAGNOSIS — R07.9 CHEST PAIN, UNSPECIFIED: ICD-10-CM

## 2018-09-07 DIAGNOSIS — R00.2 PALPITATIONS: ICD-10-CM

## 2018-09-07 DIAGNOSIS — E87.0 HYPEROSMOLALITY AND HYPERNATREMIA: ICD-10-CM

## 2018-09-07 DIAGNOSIS — Z98.82 BREAST IMPLANT STATUS: ICD-10-CM

## 2018-12-18 PROBLEM — I10 ESSENTIAL (PRIMARY) HYPERTENSION: Chronic | Status: ACTIVE | Noted: 2018-09-03

## 2018-12-19 ENCOUNTER — EMERGENCY (EMERGENCY)
Facility: HOSPITAL | Age: 58
LOS: 0 days | Discharge: HOME | End: 2018-12-20
Attending: EMERGENCY MEDICINE | Admitting: EMERGENCY MEDICINE

## 2018-12-19 VITALS
TEMPERATURE: 98 F | RESPIRATION RATE: 20 BRPM | OXYGEN SATURATION: 100 % | HEART RATE: 85 BPM | SYSTOLIC BLOOD PRESSURE: 152 MMHG | DIASTOLIC BLOOD PRESSURE: 82 MMHG

## 2018-12-19 DIAGNOSIS — R07.9 CHEST PAIN, UNSPECIFIED: ICD-10-CM

## 2018-12-19 DIAGNOSIS — R06.02 SHORTNESS OF BREATH: ICD-10-CM

## 2018-12-19 DIAGNOSIS — Z98.82 BREAST IMPLANT STATUS: Chronic | ICD-10-CM

## 2018-12-19 DIAGNOSIS — R51 HEADACHE: ICD-10-CM

## 2018-12-19 DIAGNOSIS — E87.6 HYPOKALEMIA: ICD-10-CM

## 2018-12-19 DIAGNOSIS — I10 ESSENTIAL (PRIMARY) HYPERTENSION: ICD-10-CM

## 2018-12-19 DIAGNOSIS — H53.8 OTHER VISUAL DISTURBANCES: ICD-10-CM

## 2018-12-19 PROBLEM — Z00.00 ENCOUNTER FOR PREVENTIVE HEALTH EXAMINATION: Status: ACTIVE | Noted: 2018-12-19

## 2018-12-20 VITALS
HEART RATE: 87 BPM | DIASTOLIC BLOOD PRESSURE: 61 MMHG | SYSTOLIC BLOOD PRESSURE: 112 MMHG | RESPIRATION RATE: 18 BRPM | OXYGEN SATURATION: 99 % | TEMPERATURE: 98 F

## 2018-12-20 LAB
ALBUMIN SERPL ELPH-MCNC: 4.1 G/DL — SIGNIFICANT CHANGE UP (ref 3.5–5.2)
ALP SERPL-CCNC: 69 U/L — SIGNIFICANT CHANGE UP (ref 30–115)
ALT FLD-CCNC: 11 U/L — SIGNIFICANT CHANGE UP (ref 0–41)
ANION GAP SERPL CALC-SCNC: 15 MMOL/L — HIGH (ref 7–14)
AST SERPL-CCNC: 16 U/L — SIGNIFICANT CHANGE UP (ref 0–41)
BILIRUB SERPL-MCNC: 0.2 MG/DL — SIGNIFICANT CHANGE UP (ref 0.2–1.2)
BUN SERPL-MCNC: 26 MG/DL — HIGH (ref 10–20)
CALCIUM SERPL-MCNC: 9.9 MG/DL — SIGNIFICANT CHANGE UP (ref 8.5–10.1)
CHLORIDE SERPL-SCNC: 99 MMOL/L — SIGNIFICANT CHANGE UP (ref 98–110)
CO2 SERPL-SCNC: 26 MMOL/L — SIGNIFICANT CHANGE UP (ref 17–32)
CREAT SERPL-MCNC: 0.7 MG/DL — SIGNIFICANT CHANGE UP (ref 0.7–1.5)
GAS PNL BLDV: SIGNIFICANT CHANGE UP
GLUCOSE SERPL-MCNC: 103 MG/DL — HIGH (ref 70–99)
HCT VFR BLD CALC: 34.4 % — LOW (ref 37–47)
HGB BLD-MCNC: 11.7 G/DL — LOW (ref 12–16)
LIDOCAIN IGE QN: 47 U/L — SIGNIFICANT CHANGE UP (ref 7–60)
MAGNESIUM SERPL-MCNC: 1.8 MG/DL — SIGNIFICANT CHANGE UP (ref 1.8–2.4)
MCHC RBC-ENTMCNC: 29.8 PG — SIGNIFICANT CHANGE UP (ref 27–31)
MCHC RBC-ENTMCNC: 34 G/DL — SIGNIFICANT CHANGE UP (ref 32–37)
MCV RBC AUTO: 87.5 FL — SIGNIFICANT CHANGE UP (ref 81–99)
NRBC # BLD: 0 /100 WBCS — SIGNIFICANT CHANGE UP (ref 0–0)
NT-PROBNP SERPL-SCNC: 57 PG/ML — SIGNIFICANT CHANGE UP (ref 0–300)
PLATELET # BLD AUTO: 219 K/UL — SIGNIFICANT CHANGE UP (ref 130–400)
POTASSIUM SERPL-MCNC: 3.2 MMOL/L — LOW (ref 3.5–5)
POTASSIUM SERPL-SCNC: 3.2 MMOL/L — LOW (ref 3.5–5)
PROT SERPL-MCNC: 6.7 G/DL — SIGNIFICANT CHANGE UP (ref 6–8)
RBC # BLD: 3.93 M/UL — LOW (ref 4.2–5.4)
RBC # FLD: 12.2 % — SIGNIFICANT CHANGE UP (ref 11.5–14.5)
SODIUM SERPL-SCNC: 140 MMOL/L — SIGNIFICANT CHANGE UP (ref 135–146)
TROPONIN T SERPL-MCNC: <0.01 NG/ML — SIGNIFICANT CHANGE UP
TROPONIN T SERPL-MCNC: <0.01 NG/ML — SIGNIFICANT CHANGE UP
WBC # BLD: 9.9 K/UL — SIGNIFICANT CHANGE UP (ref 4.8–10.8)
WBC # FLD AUTO: 9.9 K/UL — SIGNIFICANT CHANGE UP (ref 4.8–10.8)

## 2018-12-20 RX ORDER — MAGNESIUM SULFATE 500 MG/ML
2 VIAL (ML) INJECTION ONCE
Qty: 0 | Refills: 0 | Status: COMPLETED | OUTPATIENT
Start: 2018-12-20 | End: 2018-12-20

## 2018-12-20 RX ORDER — POTASSIUM CHLORIDE 20 MEQ
40 PACKET (EA) ORAL ONCE
Qty: 0 | Refills: 0 | Status: COMPLETED | OUTPATIENT
Start: 2018-12-20 | End: 2018-12-20

## 2018-12-20 RX ORDER — ASPIRIN/CALCIUM CARB/MAGNESIUM 324 MG
325 TABLET ORAL ONCE
Qty: 0 | Refills: 0 | Status: COMPLETED | OUTPATIENT
Start: 2018-12-20 | End: 2018-12-20

## 2018-12-20 RX ADMIN — Medication 50 GRAM(S): at 04:42

## 2018-12-20 RX ADMIN — Medication 40 MILLIEQUIVALENT(S): at 04:43

## 2018-12-20 RX ADMIN — Medication 325 MILLIGRAM(S): at 01:31

## 2018-12-20 NOTE — ED ADULT NURSE NOTE - NSIMPLEMENTINTERV_GEN_ALL_ED
Implemented All Universal Safety Interventions:  [place] to call system. Call bell, personal items and telephone within reach. Instruct patient to call for assistance. Room bathroom lighting operational. Non-slip footwear when patient is off stretcher. Physically safe environment: no spills, clutter or unnecessary equipment. Stretcher in lowest position, wheels locked, appropriate side rails in place.

## 2018-12-20 NOTE — ED PROVIDER NOTE - PHYSICAL EXAMINATION
General: AOx4, Non toxic appearing, NAD, speaking in full sentences.   Skin: Warm and dry, no acute rash.   Head:  Normocephalic, atraumatic.   EENT: PERRL/EOMI, conjunctiva and sclera clear. MM moist, no nasal discharge.  No mastoid or temporal ttp.   Neck: Supple nt, no meningeal signs.   Heart RRR s1s2 nl, no rub/murmur.   Lungs- No retractions, BS equal, CTAB.   Abdomen: Soft ntnd no r/g.   Extremities- moves all, +equal distal pulses, brisk cap refill. No LE edema, calves nttp b/l.  Neuro: Sensation wnl, CN 2-12 grossly intact. +5/5 muscle strength throughout.  Psych: Cooperative, appropriate

## 2018-12-20 NOTE — ED CDU PROVIDER INITIAL DAY NOTE - PHYSICAL EXAMINATION
PHYSICAL EXAM:    GENERAL: Alert, appears stated age, well appearing, non-toxic  SKIN: Warm, pink and dry. MMM.   EYE: Normal lids/conjunctiva  ENT: Normal hearing, patent oropharynx  NECK: +supple. No meningismus  Pulm: Bilateral BS, normal resp effort, no wheezes, stridor, or retractions  CV: RRR, no M/R/G, 2+and = radial pulses +mild TTP of precordium.   Abd: soft, non-tender, non-distended  Mskel: no erythema, cyanosis, edema. no calf tenderness  Neuro: AAOx3,  normal gait..

## 2018-12-20 NOTE — ED CDU PROVIDER INITIAL DAY NOTE - NS ED ROS FT
Review of Systems    Constitutional: (-) fever  Cardiovascular: (+) chest pain, (-) syncope  Respiratory: (-) cough, (+) shortness of breath  Gastrointestinal: (-) vomiting, (-) diarrhea  Musculoskeletal: (-) neck pain, (-) back pain  Integumentary: (-) rash, (-) edema  Neurological: (-) headache

## 2018-12-20 NOTE — ED PROVIDER NOTE - OBJECTIVE STATEMENT
58F pmh hypokalemia, palpitations, HTN p/w intermittent non exertional, non radiating mid chest pain associated w/ SOB for past several days. Pt also c/o intermittent HA and blurred vision. Pt seen for same symptoms in September, was admitted, had negative enzymes, negative CT head, normal echo by Dr Bush and was discharged. Pt seen in [facility] ED on 12/5 for similar symptoms and was discharged to follow up with unknown cardiologist on 12/31, pt was given rx for naproxen which has been helping with her pain. Pt denies f/c, cough, abd pain, back pain, n/v/d, calf pain/swelling 58F pmh hypokalemia, palpitations, HTN p/w intermittent non exertional, non radiating mid chest pain associated w/ SOB for past several days. Pt also c/o intermittent HA and blurred vision. Pt seen for same symptoms in September, was admitted, had negative enzymes, negative CT head, normal echo by Dr Bush, was asymptomatic in hospital and had no recorded events on tele and was discharged. Pt seen in [facility] ED on 12/5 for similar symptoms and was discharged to follow up with unknown cardiologist on 12/31, pt was given rx for naproxen which has been helping with her pain. Pt denies f/c, cough, abd pain, back pain, n/v/d, calf pain/swelling

## 2018-12-20 NOTE — ED CDU PROVIDER INITIAL DAY NOTE - OBJECTIVE STATEMENT
pt is Barbadian speaking only, her  is accepted ad hoc  and was used in all aspects of my documentation of this encounter.     59 y/o F with PMH HTN, hypokalemia (has been hospitalized for this in past, never with EKG changes or complications) presents with episode of CP lasting 10 minutes +associated with SOB during episode. no current symptoms. woke pt from sleep. +hx of prior. +benefit with naproxen. although ED note indicates that she had HA and blurred vison, on my questioning, pt relates that she was saying that she has had HA/blurred vision in the past with her CP but not today/yesterday/in the last week. she does not have a cardiologist, but she has seen [facility] inpatient before. +URI 1 wk ago. Denies palpitations, back pain, abdominal pain, n/v/d, trauma, fall, recent travel,sick contacts, leg pain/swelling, urinary symptoms, rash, hormone therapy.

## 2018-12-20 NOTE — ED PROVIDER NOTE - ATTENDING CONTRIBUTION TO CARE
59 yo female with PMH HTN, palpitations presents c/o intermittent chest pain which occurs at rest. Pt denied radiation of pain, but reported mild SOB at times. No fevers, chills, cough or URI sx. . Also reported intermittent HA x several months. No focal weakness or paresthesias. Denied any N/V/D or abdominal pain. Cardiologist is Dr. Bush.    VS noted, agree with exam as above.  A/P: Chest pain -EKG, CXR, labs, EDOU for further workup and monitoring as pt denied any previous provocative testing.

## 2018-12-20 NOTE — ED PROVIDER NOTE - NS ED ROS FT
Constitutional: NAD  Eyes: +intermittent visual changes. No eye pain or discharge.  ENMT: No hearing changes, pain, discharge or infections. No neck pain or stiffness.  Cardiac: +chest pain, SOB. No edema. No chest pain with exertion.  Respiratory: No cough or respiratory distress.   GI: No nausea, vomiting, diarrhea or abdominal pain.  : No dysuria, frequency or burning.  MS: No myalgia, muscle weakness, joint pain or back pain.  Neuro: +intermittent headache. No weakness. No LOC.  Skin: No skin rash.  Heme: No bruising

## 2018-12-31 ENCOUNTER — OUTPATIENT (OUTPATIENT)
Dept: OUTPATIENT SERVICES | Facility: HOSPITAL | Age: 58
LOS: 1 days | Discharge: HOME | End: 2018-12-31

## 2018-12-31 ENCOUNTER — APPOINTMENT (OUTPATIENT)
Dept: CARDIOLOGY | Facility: CLINIC | Age: 58
End: 2018-12-31

## 2018-12-31 VITALS
WEIGHT: 198 LBS | TEMPERATURE: 96.1 F | BODY MASS INDEX: 35.08 KG/M2 | HEIGHT: 63 IN | DIASTOLIC BLOOD PRESSURE: 73 MMHG | SYSTOLIC BLOOD PRESSURE: 118 MMHG | HEART RATE: 89 BPM

## 2018-12-31 DIAGNOSIS — Z98.82 BREAST IMPLANT STATUS: Chronic | ICD-10-CM

## 2018-12-31 NOTE — PHYSICAL EXAM
[General Appearance - Well Developed] : well developed [Normal Appearance] : normal appearance [Well Groomed] : well groomed [General Appearance - Well Nourished] : well nourished [No Deformities] : no deformities [General Appearance - In No Acute Distress] : no acute distress [Normal Conjunctiva] : the conjunctiva exhibited no abnormalities [Eyelids - No Xanthelasma] : the eyelids demonstrated no xanthelasmas [Normal Oral Mucosa] : normal oral mucosa [No Oral Pallor] : no oral pallor [No Oral Cyanosis] : no oral cyanosis [Normal Jugular Venous A Waves Present] : normal jugular venous A waves present [Normal Jugular Venous V Waves Present] : normal jugular venous V waves present [No Jugular Venous Potts A Waves] : no jugular venous potts A waves [Heart Rate And Rhythm] : heart rate and rhythm were normal [Heart Sounds] : normal S1 and S2 [Murmurs] : no murmurs present [Respiration, Rhythm And Depth] : normal respiratory rhythm and effort [Exaggerated Use Of Accessory Muscles For Inspiration] : no accessory muscle use [Auscultation Breath Sounds / Voice Sounds] : lungs were clear to auscultation bilaterally [Abdomen Soft] : soft [Abdomen Tenderness] : non-tender [Abdomen Mass (___ Cm)] : no abdominal mass palpated [Abnormal Walk] : normal gait [Gait - Sufficient For Exercise Testing] : the gait was sufficient for exercise testing [Nail Clubbing] : no clubbing of the fingernails [Cyanosis, Localized] : no localized cyanosis [Petechial Hemorrhages (___cm)] : no petechial hemorrhages [Skin Color & Pigmentation] : normal skin color and pigmentation [] : no rash [No Venous Stasis] : no venous stasis [Skin Lesions] : no skin lesions [No Skin Ulcers] : no skin ulcer [No Xanthoma] : no  xanthoma was observed [Oriented To Time, Place, And Person] : oriented to person, place, and time [Affect] : the affect was normal [Mood] : the mood was normal [No Anxiety] : not feeling anxious

## 2019-01-03 DIAGNOSIS — Z86.79 PERSONAL HISTORY OF OTHER DISEASES OF THE CIRCULATORY SYSTEM: ICD-10-CM

## 2019-01-03 DIAGNOSIS — R07.9 CHEST PAIN, UNSPECIFIED: ICD-10-CM

## 2019-01-03 DIAGNOSIS — R00.2 PALPITATIONS: ICD-10-CM

## 2019-01-03 DIAGNOSIS — I10 ESSENTIAL (PRIMARY) HYPERTENSION: ICD-10-CM

## 2019-01-06 NOTE — END OF VISIT
[] : Resident [FreeTextEntry3] : Seen / examined and above reviewed.\par \par No cardiac history.  Risk factors include HTN.\par CP atypical.  Not anginal.\par \par Primary concern is palpitations.  No clear precipitants.  Generally random.  Occurring almost daily.  Associated with vague chest discomfort.  No lightheadedness / syncope.\par BP controlled.\par \par - EKG, ECHO, Holter, Labs.\par - Cont Diovan.\par - Reg PMD follow-up.\par - Follow-up 6-weeks.

## 2019-01-06 NOTE — HISTORY OF PRESENT ILLNESS
[FreeTextEntry1] : 59 yo F PMH of HTN, breast reduction surgery presenting to [facility] care. Interval history positive for 4 visits to the ED on hospitalizations for chest pains and palpations. She reports she lives at home with  and there relationship is good with no stress. She reports having exertional chest pain and palpations while she is doing her house shores and she attributes this pain to her heart. She recently had breast reduction surgery at [place] and stayed in hospital for around 3 days. She reports no JOINER, LOC, dizziness or lightheadedness. She was recently started on diovan for her blood pressure. No family history of cardiac disease or sudden death. Recent nuclear stress test in hospital showed no abnormalities. No excessive caffeine use.

## 2019-01-06 NOTE — ASSESSMENT
[FreeTextEntry1] : 59 yo F presenting with palpiations and CP\par 1. Palpitations followed by CP: \par -24 hour Holter monitor. Echo. TSH. CBC, CMP, Magnesium, Lipid\par 2. HTN continue Diovan 160.\par 3. RTC 6 weeks

## 2019-01-06 NOTE — REASON FOR VISIT
[Initial Evaluation] : an initial evaluation of [Chest Pain] : chest pain [Palpitations] : palpitations [[employer] ] : provided by [employer]   [FreeTextEntry1] : 59 yo F PMH of HTN, breast reduction surgery presenting to [facility] care. Interval history positive for 4 visits to the ED on hospitalizations for chest pains and palpations. She reports she lives at home with  and there relationship is good with no stress. She reports having exertional chest pain and palpations while she is doing her house shores and she attributes this pain to her heart. She recently had breast reduction surgery at [place] and stayed in hospital for around 2 days. She reports no JOINER, LOC, dizziness or lightheadedness. She was recently started on diovan for her blood pressure. No family history of cardiac disease or sudden death.  [FreeTextEntry2] : Chuck

## 2019-02-25 ENCOUNTER — APPOINTMENT (OUTPATIENT)
Dept: CARDIOLOGY | Facility: CLINIC | Age: 59
End: 2019-02-25

## 2019-06-30 ENCOUNTER — FORM ENCOUNTER (OUTPATIENT)
Age: 59
End: 2019-06-30

## 2019-07-10 ENCOUNTER — FORM ENCOUNTER (OUTPATIENT)
Age: 59
End: 2019-07-10

## 2019-07-14 ENCOUNTER — FORM ENCOUNTER (OUTPATIENT)
Age: 59
End: 2019-07-14

## 2019-07-15 ENCOUNTER — FORM ENCOUNTER (OUTPATIENT)
Age: 59
End: 2019-07-15

## 2019-07-16 ENCOUNTER — FORM ENCOUNTER (OUTPATIENT)
Age: 59
End: 2019-07-16

## 2019-07-28 ENCOUNTER — INPATIENT (INPATIENT)
Facility: HOSPITAL | Age: 59
LOS: 2 days | Discharge: HOME | End: 2019-07-31
Attending: INTERNAL MEDICINE | Admitting: INTERNAL MEDICINE
Payer: MEDICAID

## 2019-07-28 VITALS
RESPIRATION RATE: 16 BRPM | OXYGEN SATURATION: 100 % | HEART RATE: 78 BPM | SYSTOLIC BLOOD PRESSURE: 113 MMHG | DIASTOLIC BLOOD PRESSURE: 77 MMHG | TEMPERATURE: 98 F

## 2019-07-28 DIAGNOSIS — Z98.82 BREAST IMPLANT STATUS: Chronic | ICD-10-CM

## 2019-07-28 LAB
ALBUMIN SERPL ELPH-MCNC: 4.1 G/DL — SIGNIFICANT CHANGE UP (ref 3.5–5.2)
ALP SERPL-CCNC: 91 U/L — SIGNIFICANT CHANGE UP (ref 30–115)
ALT FLD-CCNC: 16 U/L — SIGNIFICANT CHANGE UP (ref 0–41)
ANION GAP SERPL CALC-SCNC: 11 MMOL/L — SIGNIFICANT CHANGE UP (ref 7–14)
AST SERPL-CCNC: 15 U/L — SIGNIFICANT CHANGE UP (ref 0–41)
BILIRUB SERPL-MCNC: 0.3 MG/DL — SIGNIFICANT CHANGE UP (ref 0.2–1.2)
BUN SERPL-MCNC: 16 MG/DL — SIGNIFICANT CHANGE UP (ref 10–20)
CALCIUM SERPL-MCNC: 9.5 MG/DL — SIGNIFICANT CHANGE UP (ref 8.5–10.1)
CHLORIDE SERPL-SCNC: 106 MMOL/L — SIGNIFICANT CHANGE UP (ref 98–110)
CO2 SERPL-SCNC: 27 MMOL/L — SIGNIFICANT CHANGE UP (ref 17–32)
CREAT SERPL-MCNC: 0.7 MG/DL — SIGNIFICANT CHANGE UP (ref 0.7–1.5)
GLUCOSE SERPL-MCNC: 96 MG/DL — SIGNIFICANT CHANGE UP (ref 70–99)
HCT VFR BLD CALC: 36 % — LOW (ref 37–47)
HGB BLD-MCNC: 12 G/DL — SIGNIFICANT CHANGE UP (ref 12–16)
MCHC RBC-ENTMCNC: 30.5 PG — SIGNIFICANT CHANGE UP (ref 27–31)
MCHC RBC-ENTMCNC: 33.3 G/DL — SIGNIFICANT CHANGE UP (ref 32–37)
MCV RBC AUTO: 91.4 FL — SIGNIFICANT CHANGE UP (ref 81–99)
NRBC # BLD: 0 /100 WBCS — SIGNIFICANT CHANGE UP (ref 0–0)
PLATELET # BLD AUTO: 214 K/UL — SIGNIFICANT CHANGE UP (ref 130–400)
POTASSIUM SERPL-MCNC: 4.2 MMOL/L — SIGNIFICANT CHANGE UP (ref 3.5–5)
POTASSIUM SERPL-SCNC: 4.2 MMOL/L — SIGNIFICANT CHANGE UP (ref 3.5–5)
PROT SERPL-MCNC: 6.8 G/DL — SIGNIFICANT CHANGE UP (ref 6–8)
RBC # BLD: 3.94 M/UL — LOW (ref 4.2–5.4)
RBC # FLD: 12.1 % — SIGNIFICANT CHANGE UP (ref 11.5–14.5)
SODIUM SERPL-SCNC: 144 MMOL/L — SIGNIFICANT CHANGE UP (ref 135–146)
TROPONIN T SERPL-MCNC: <0.01 NG/ML — SIGNIFICANT CHANGE UP
TROPONIN T SERPL-MCNC: <0.01 NG/ML — SIGNIFICANT CHANGE UP
WBC # BLD: 6.11 K/UL — SIGNIFICANT CHANGE UP (ref 4.8–10.8)
WBC # FLD AUTO: 6.11 K/UL — SIGNIFICANT CHANGE UP (ref 4.8–10.8)

## 2019-07-28 PROCEDURE — 99218: CPT

## 2019-07-28 PROCEDURE — 93010 ELECTROCARDIOGRAM REPORT: CPT

## 2019-07-28 PROCEDURE — 71046 X-RAY EXAM CHEST 2 VIEWS: CPT | Mod: 26

## 2019-07-28 PROCEDURE — 70450 CT HEAD/BRAIN W/O DYE: CPT | Mod: 26

## 2019-07-28 PROCEDURE — 70498 CT ANGIOGRAPHY NECK: CPT | Mod: 26

## 2019-07-28 RX ORDER — ACETAMINOPHEN 500 MG
650 TABLET ORAL ONCE
Refills: 0 | Status: COMPLETED | OUTPATIENT
Start: 2019-07-28 | End: 2019-07-28

## 2019-07-28 RX ADMIN — Medication 650 MILLIGRAM(S): at 20:38

## 2019-07-28 NOTE — ED ADULT TRIAGE NOTE - CHIEF COMPLAINT QUOTE
BIBA from home with chest pain, palpitations and neck pain since yesterday, EMS gave ASA 162mg, nitro X 1

## 2019-07-28 NOTE — ED PROVIDER NOTE - OBJECTIVE STATEMENT
59F with pmh of HTN and carotid dissection diagnosed at [facility] earlier this month presents with CP, palpitations, and worsening neck pain beginning this morning at 7AM. PT states that she has had numbness over the L side of the face but that sx are worsening. Denies fever, chills, n/v/d, CP, SOB, weakness, or numbness.

## 2019-07-28 NOTE — ED CDU PROVIDER INITIAL DAY NOTE - PHYSICAL EXAMINATION
Constitutional: Well developed, well nourished. NAD  Head: Normocephalic, atraumatic.  Eyes: PERRL, EOMI.  ENT: No nasal discharge. Mucous membranes dry.  Neck: Supple. Painless ROM.  Cardiovascular:. Regular rate and rhythm.   Pulmonary:  Lungs clear to auscultation bilaterally.   Abdominal: Soft. Nondistended. No rebound, guarding, rigidity.  Extremities. Pelvis stable. No lower extremity edema, symmetric calves.  Skin: No rashes, cyanosis.  Neuro: AAOx3. No focal neurological deficits.  Psych: Normal mood. Normal affect.

## 2019-07-28 NOTE — ED ADULT NURSE REASSESSMENT NOTE - NS ED NURSE REASSESS COMMENT FT1
Pt. received from Front of ED. Patient laying in bed. Complains of some generalized pain and discomfort. Tylenol administered per MD order. Denies any SOB, no distress noted. Patient denies chest pain or dizziness at current time. Repeat troponin level sent, results pending. Continues on cardiac monitoring. Will continue to monitor.

## 2019-07-28 NOTE — ED ADULT NURSE NOTE - ISOLATION TYPE:
None
previous_biopsy_has_been_previously_biopsied
Body Location Override (Optional): left lateral eyebrow

## 2019-07-28 NOTE — ED ADULT NURSE NOTE - CHPI ED NUR SYMPTOMS NEG
no diaphoresis/no fever/no chest pain/no back pain/no shortness of breath/no vomiting/no nausea/no chills/no syncope/no dizziness/no congestion

## 2019-07-28 NOTE — ED CDU PROVIDER INITIAL DAY NOTE - NS ED ROS FT
Constitutional: No fever, chills.  Eyes: No visual changes.  ENT: No hearing changes.  Neck: No neck pain or stiffness.  Cardiovascular: see hpi  Pulmonary: No SOB, cough. No hemoptysis.  Abdominal:  No nausea, vomiting, diarrhea.  : No dysuria, frequency.  Neuro: No headache, syncope, dizziness. + left facial paresthesia  MS: No back pain. No calf pain/swelling.  Psych: No suicidal ideations.

## 2019-07-28 NOTE — ED PROVIDER NOTE - CARE PLAN
Principal Discharge DX:	Chest pain  Secondary Diagnosis:	Palpitations  Secondary Diagnosis:	Carotid artery dissection

## 2019-07-28 NOTE — ED PROVIDER NOTE - CLINICAL SUMMARY MEDICAL DECISION MAKING FREE TEXT BOX
L facial paresthesia improved to baseline. CT imaging reviewed, finding noted, does not appear to have acute findings. Pt has improved but continued CP. discussed plan for obs placement with family and pt- they understand and agree. pt placed in OBS for CP in setting of dx uncertainty.

## 2019-07-28 NOTE — ED CDU PROVIDER INITIAL DAY NOTE - ATTENDING CONTRIBUTION TO CARE
58 yo f PMhx Htn, hx carotid dissection 7/24/19 dx at [facility] (pending vascular eval outpt) here for L CP. sx described as pressure in AM. negative nuc stress 12/18. no n/v/diaphoresis, sob, back pain. pt endorses L sided facial paresthesias. pt w/ negative CTH. CTA showing mild fusiform dilation of distal cervical R ICA.     vss  gen- NAD, aaox3  card-rrr  lungs-ctab, no wheezing or rhonchi  abd-sntnd, no guarding or rebound  neuro- full str/sensation, cn ii-xii grossly intact, normal coordination and gait      pt in obs under acs r/o, pending cta coronaries

## 2019-07-28 NOTE — ED PROVIDER NOTE - ATTENDING CONTRIBUTION TO CARE
58 y/o F pmh L carotid artery dissection, w/ residual L facial paresthesia, HTN, p/w cp and palpitations since this AM. No back pain, trauma, fever, cough, leg swelling. + worsening L facial paresthesia since waking this 6am. No other neuro deficit. Had obs w/ nuc stress but no CCTA end of 2018.    CONSTITUTIONAL: NAD  SKIN: Warm dry  HEAD: NCAT  EYES: NL inspection  ENT: MMM  NECK: Supple; non tender; no bruit  CARD: RRR  RESP: CTAB  ABD: S/NT no R/G  EXT: no pedal edema  NEURO: Grossly unremarkable  PSYCH: Cooperative, appropriate    IMP: acs; consider worsening carotid dissection  P: labs, cxr, ekg, CTA head and neck, reassess. 60 y/o F pmh L carotid artery dissection, w/ residual L facial paresthesia, HTN, p/w cp and palpitations since this AM. No back pain, trauma, fever, cough, leg swelling. + worsening L facial paresthesia since waking this 6am. No other neuro deficit. Had CTimaging showing L carotid artery dissection, started on antiplt.  Had obs w/ nuc stress but no CCTA end of 2018.    CONSTITUTIONAL: NAD  SKIN: Warm dry  HEAD: NCAT  EYES: NL inspection  ENT: MMM  NECK: Supple; non tender; no bruit  CARD: RRR  RESP: CTAB  ABD: S/NT no R/G  EXT: no pedal edema  NEURO: Grossly unremarkable  PSYCH: Cooperative, appropriate    IMP: acs; consider worsening carotid dissection vs CVA, Not candidate for tPA  P: labs, cxr, ekg, CTA head and neck, reassess.

## 2019-07-28 NOTE — ED CDU PROVIDER INITIAL DAY NOTE - OBJECTIVE STATEMENT
59 yold female to Ed Yakut speaking family member intrep upon pt request PMhx Htn, hx carotid dissection 7/24/19 dx at [facility] (pending vascular eval outpt); pt presents to Ed c/o mild left side chest pain described as pressure started this am; pt with prior nl nuclear stress 12/18; pt denies n/v/diaphoresis, sob, back pain; pt still with left facial paresthesia;   Pt seen and evaluated in main Ed - ct head negative, ct angio neck showing mild fusiform aneurysmal dilatation of the distal cervical right internal carotid artery;

## 2019-07-28 NOTE — ED PROVIDER NOTE - PHYSICAL EXAMINATION
CONSTITUTIONAL: Well-developed; well-nourished; in no acute distress.   SKIN: warm, dry  HEAD: Normocephalic; atraumatic.  EYES: PERRL, EOMI, no conjunctival erythema  ENT: No nasal discharge; airway clear.  NECK: Supple; non tender.  CARD: S1, S2 normal; no murmurs, gallops, or rubs. Regular rate and rhythm.   RESP: No wheezes, rales or rhonchi.  ABD: soft ntnd  EXT: Normal ROM.  No clubbing, cyanosis or edema.   LYMPH: No acute cervical adenopathy.  NEURO: Alert, oriented, grossly unremarkable. gait steady, no dysmetria, no pronator drift, speech clear, CN II-XII grossly intact, negative romberg (subjective numbness but no objective numbness of L side of face)  PSYCH: Cooperative, appropriate.

## 2019-07-28 NOTE — ED CDU PROVIDER INITIAL DAY NOTE - PROGRESS NOTE DETAILS
received signout from Dr. Johns for evaluation of chest pain; pt had ct angio neck today - will schedule for ccta later tomorrow;

## 2019-07-28 NOTE — ED PROVIDER NOTE - NS ED ROS FT
Constitutional: (-) fever (-) vomiting  Eyes/ENT: (-) eye discharge, (-) runny nose  Cardiovascular: (+) chest pain, (-) syncope  Respiratory: (-) cough, (-) shortness of breath  Gastrointestinal: (-) vomiting, (-) diarrhea, (-) abdominal pain  : (-) dysuria   Musculoskeletal: (-) neck pain, (-) back pain, (-) joint pain  Integumentary: (-) rash, (-) edema  Neurological: (-) headache, (-)loc  Allergic/Immunologic: (-) pruritus  Endocrine: No history of thyroid disease or diabetes.

## 2019-07-29 LAB — ERYTHROCYTE [SEDIMENTATION RATE] IN BLOOD: 22 MM/HR — HIGH (ref 0–20)

## 2019-07-29 PROCEDURE — 99223 1ST HOSP IP/OBS HIGH 75: CPT | Mod: AI

## 2019-07-29 PROCEDURE — 99217: CPT

## 2019-07-29 PROCEDURE — 99222 1ST HOSP IP/OBS MODERATE 55: CPT

## 2019-07-29 PROCEDURE — 70450 CT HEAD/BRAIN W/O DYE: CPT | Mod: 26

## 2019-07-29 RX ORDER — ENOXAPARIN SODIUM 100 MG/ML
40 INJECTION SUBCUTANEOUS DAILY
Refills: 0 | Status: DISCONTINUED | OUTPATIENT
Start: 2019-07-29 | End: 2019-07-31

## 2019-07-29 RX ORDER — ATORVASTATIN CALCIUM 80 MG/1
80 TABLET, FILM COATED ORAL AT BEDTIME
Refills: 0 | Status: DISCONTINUED | OUTPATIENT
Start: 2019-07-29 | End: 2019-07-31

## 2019-07-29 RX ORDER — ASPIRIN/CALCIUM CARB/MAGNESIUM 324 MG
81 TABLET ORAL DAILY
Refills: 0 | Status: DISCONTINUED | OUTPATIENT
Start: 2019-07-29 | End: 2019-07-31

## 2019-07-29 RX ORDER — LOSARTAN POTASSIUM 100 MG/1
100 TABLET, FILM COATED ORAL DAILY
Refills: 0 | Status: DISCONTINUED | OUTPATIENT
Start: 2019-07-29 | End: 2019-07-31

## 2019-07-29 RX ORDER — METOCLOPRAMIDE HCL 10 MG
10 TABLET ORAL ONCE
Refills: 0 | Status: COMPLETED | OUTPATIENT
Start: 2019-07-29 | End: 2019-07-29

## 2019-07-29 RX ORDER — LABETALOL HCL 100 MG
5 TABLET ORAL ONCE
Refills: 0 | Status: COMPLETED | OUTPATIENT
Start: 2019-07-29 | End: 2019-07-29

## 2019-07-29 RX ORDER — OFLOXACIN 0.3 %
1 DROPS OPHTHALMIC (EYE)
Refills: 0 | Status: DISCONTINUED | OUTPATIENT
Start: 2019-07-29 | End: 2019-07-31

## 2019-07-29 RX ORDER — CLOPIDOGREL BISULFATE 75 MG/1
75 TABLET, FILM COATED ORAL DAILY
Refills: 0 | Status: DISCONTINUED | OUTPATIENT
Start: 2019-07-29 | End: 2019-07-31

## 2019-07-29 RX ADMIN — Medication 5 MILLIGRAM(S): at 03:43

## 2019-07-29 RX ADMIN — Medication 104 MILLIGRAM(S): at 03:55

## 2019-07-29 RX ADMIN — Medication 1 DROP(S): at 12:13

## 2019-07-29 RX ADMIN — Medication 1 DROP(S): at 17:35

## 2019-07-29 RX ADMIN — Medication 1 DROP(S): at 11:33

## 2019-07-29 RX ADMIN — Medication 10 MILLIGRAM(S): at 04:05

## 2019-07-29 RX ADMIN — LOSARTAN POTASSIUM 100 MILLIGRAM(S): 100 TABLET, FILM COATED ORAL at 12:10

## 2019-07-29 RX ADMIN — ATORVASTATIN CALCIUM 80 MILLIGRAM(S): 80 TABLET, FILM COATED ORAL at 21:24

## 2019-07-29 RX ADMIN — Medication 1 DROP(S): at 23:40

## 2019-07-29 RX ADMIN — ENOXAPARIN SODIUM 40 MILLIGRAM(S): 100 INJECTION SUBCUTANEOUS at 12:11

## 2019-07-29 NOTE — CONSULT NOTE ADULT - ASSESSMENT
1) Corneal abrasion OS  - START ofloxacin oph sol q6H OS x 1 week  - START artificial tears q1-2H OS  - START ketorolac oph sol up to q8H for pain relief  - explained to patient that it will take at least 24-48 hours to heal    2) h/o amaurosis fugax OS  - f/u with neuro    3) Uncorrected refractive error OU  - Pt to f/u with current eye care provider/[facility] eye clinic for refraction as an outpatient 1) Corneal abrasion OS  - START ofloxacin oph sol q6H OS x 1 week  - START artificial tears q1-2H OS  - START ketorolac oph sol up to q8H for pain relief  - explained to patient that it will take at least 24-48 hours to heal  - RTC in 1-2 days for f/u     2) h/o amaurosis fugax OS  - dilation of left ICA found with CTA today  - continue to f/u with neuro/vascular as previously recommended; anti-coagulants as previously recommended     3) Uncorrected refractive error OU  - Pt to f/u with current eye care provider/[facility] eye clinic for refraction as an outpatient

## 2019-07-29 NOTE — ED CDU PROVIDER SUBSEQUENT DAY NOTE - ATTENDING CONTRIBUTION TO CARE
overnight, pt endorsing L sided HA, decreased visual acuity. pt states sx occur when she is hypertensive. labetalol 5mg IV ordered w/o improvement of sx. antimigraine meds given w/o improvement. stroke code called. pt seen by neurology, cth repeated, no change. optho consulted and aware of urgent consult for acute visual change, CRAO/CRVO. pt w/ admitted to tele for further w/u and monitoring. sx gradually improving during/after stroke code.

## 2019-07-29 NOTE — ED CDU PROVIDER SUBSEQUENT DAY NOTE - MEDICAL DECISION MAKING DETAILS
pt w/ acute visual change, supportive measured ordered w/o improvement. stroke code called, neuro consulted, cth unchanged. no acute interventions recommended. optho consulted. admitted for further w/u

## 2019-07-29 NOTE — H&P ADULT - ATTENDING COMMENTS
LABS:                 PHYSICAL EXAM:    GENERAL: NAD, well-developed, AAOx3  HEENT:  Atraumatic, Normocephalic. EOMI, PERRLA, conjunctiva and sclera clear, No JVD  PULMONARY: Clear to auscultation bilaterally; No wheeze  CARDIOVASCULAR: Regular rate and rhythm; No murmurs, rubs, or gallops  GASTROINTESTINAL: Soft, Nontender, Nondistended; Bowel sounds present  MUSCULOSKELETAL:  2+ Peripheral Pulses, No clubbing, cyanosis, or edema  NEUROLOGY: non-focal  SKIN: No rashes or lesions      ADMISSION SUMMARY  Patient is a 59y old Female who presents with a chief complaint of Currently admitted to medicine with the primary diagnosis of Acute visual loss, left  Hospital course has been complicated by _______.       ASSESSMENT & PLAN    1. ACUTE VISUAL LOSS,LEFT;CHEST PAIN;PALPITATIONS      2.    3. Palpitations  Hypertension        Present today:  ( ) Congestive Heart Failure, Yes? ( )Acute / ( )Acute on Chronic / ( )Chronic  :  ( )Systolic / ( )Diastolic               Plan:  ( ) Complicated Pneumonia, Type?  ( )Parapneumonic effusion / ( )Abscess / ( ) Multilobar / ( )Other               Plan:  ( ) Morbid Obesity, Yes? BMI:               Plan:  ( ) Functional Quadriplegia               Plan:  ( ) Encephalopathy               Plan:    ( ) Discussion with patient and/or family regarding goals of care  ( ) Discussed Case and Plan with Medical Attending, Name:      # Planned Disposition: ________ #Progress Note Handoff    Pending (specify):  neurovascular and EP follow up for the left carotid stenosis and also palpitation.  EP for palpitation constant ans chronic. multiple episodes and admissions. Keep on telemetry   Family discussion: D/W patient with the help of  Reva . (PCA)  Disposition: Home

## 2019-07-29 NOTE — CONSULT NOTE ADULT - ATTENDING COMMENTS
Patient seen and examined and agree with above except as noted.  Patient has left eye blurry vision and ophtho exam suggests corneal abrasion.  Reviewed imaging and suspicious for FMD and possible dissection in left carotid.  Remaining exam is normal with no complaints    NIHSS 0  mrankin 0    Plan as above

## 2019-07-29 NOTE — ED CDU PROVIDER SUBSEQUENT DAY NOTE - HISTORY
pt currently c/o palpitation, Left eye vision loss started now; pt admit to prior similar episodes in past when bp elevated - usually resolves after taking her bp meds at home; pt also c/o Left side facial paresthesia; denies headache, n/v, left side weakness arm/legs; Bp 180/87  Pt given Labetalol 5mg IV with improvement of bp;   stroke code called and ct head, cta neck ordered; Neuro Pa at bed side; pt currently c/o palpitation, Left eye vision loss started now; pt admit to prior similar episodes in past when bp elevated - usually resolves after taking her bp meds at home; pt also c/o Left side facial paresthesia; denies headache, n/v, left side weakness arm/legs; Bp 180/87; Va 20/40 OD; Left eye redness and mild left side facial swelling; NIH ss 0;  Pt given Labetalol 5mg IV with improvement of bp;   stroke code called and ct head, cta neck ordered; Neuro Pa at bed side; pt currently c/o palpitation, Left eye vision loss started now; pt admit to prior similar episodes in past when bp elevated - usually resolves after taking her bp meds at home; pt also c/o Left side facial paresthesia; denies headache, n/v, left side weakness arm/legs; Bp 180/87; Va 20/40 OD; 20/200 OS; Left eye conjunctival injection; perrl bilat; IOP Left 23; right 22; NIH ss 0; case d/w ophthalmology resident DR. Larsen - will see pt in ED3;   Pt given Labetalol 5mg IV with improvement of bp;   stroke code called and ct head, cta neck ordered; Neuro Pa at bed side;  Will admit pt to Tele to continue neuro/cardiac evaluation

## 2019-07-29 NOTE — ED ADULT NURSE REASSESSMENT NOTE - NS ED NURSE REASSESS COMMENT FT1
Patient resting comfortably in bed. Denies any chest pain, dizziness or headache at current time. No SOB or distress noted. Continues on cardiac monitoring. Troponin negative x 2. Will continue to monitor.

## 2019-07-29 NOTE — H&P ADULT - NSHPPHYSICALEXAM_GEN_ALL_CORE
VITAL SIGNS: Last 24 Hours  T(C): 36.9 (29 Jul 2019 07:28), Max: 36.9 (29 Jul 2019 07:28)  T(F): 98.5 (29 Jul 2019 07:28), Max: 98.5 (29 Jul 2019 07:28)  HR: 73 (29 Jul 2019 07:28) (69 - 116)  BP: 124/78 (29 Jul 2019 07:28) (100/61 - 182/96)  BP(mean): --  RR: 18 (29 Jul 2019 07:28) (16 - 19)  SpO2: 100% (29 Jul 2019 07:28) (98% - 100%)      GENERAL: Anxious looking, avoiding eye contact  HEAD:  Atraumatic, Normocephalic  EYES: Left conjunctival injection  ENT: Moist mucous membranes  NECK: Supple, No JVD, No thyromegaly, left neck tenderness  CHEST/LUNG: Clear to auscultation bilaterally; No rales, rhonchi, wheezing, or rubs. Unlabored respirations  HEART: Regular rate and rhythm; No murmurs, rubs, or gallops  ABDOMEN: Bowel sounds present; Soft, Nontender, Nondistended.  EXTREMITIES:  2+ Peripheral Pulses, brisk capillary refill. No clubbing, cyanosis, or edema  NERVOUS SYSTEM:  Alert & Oriented X3, speech clear. No motor or sensory deficits   MSK: FROM all 4 extremities, full and equal strength  SKIN: No rashes or lesions

## 2019-07-29 NOTE — ED CDU PROVIDER SUBSEQUENT DAY NOTE - PROGRESS NOTE DETAILS
Hester- Obs PA called me for acute visual change to L eye. Visual acuity now to movement. No eye pain although eye injected. + b/l neck soreness. EOMI, pupil 3mm and reactive. Globe not hard, equal firmness with other. Pt endorses this has happened before when she is hypertensive and sx often improve within 30 minutes after taking her valsartan. Pt took her valsartan after this occurred. Sx unchanged. labetalol 5mg IVP given w/ improvement of BP to 138/85 w/o significant improvement of sx. reglan 10mg IVP given for possible atypical migraine and stroke code called. FS 95, ESR/CRP ordered. Neuro came to evaluate, CTH noncon ordered and unchanged. No further imaging needed per Neuro NP Arnold after d/w on call neurologist. Optho consulted for painless vision loss. ddx crao, crvo, TA, opthalmic migraine. unlikely acute angle glaucoma. pt reevaluated - visual acuity improved 20/40 Left; 20/30 right; Ophthalmology resident Dr. Diaz at bed side;

## 2019-07-29 NOTE — ED ADULT NURSE REASSESSMENT NOTE - NS ED NURSE REASSESS COMMENT FT1
Pt reassessed A/O times 4 Vs stable remain safety asymptomatic no pain no SOB no dizziness on and off blurbed vision ,dinner tray provide did eat 100% safety precaution on progress ,on going nursing observation.

## 2019-07-29 NOTE — H&P ADULT - ASSESSMENT
# Suspicion of TIA  - Code stroke called this morning.NIHSS =0  -CTA of the neck done prior to the stroke code revealed beaded appearance of the left ICA ( r/o fibromascular dysplasia) . No dissection   - Recent diagniosis of left ICA dissection?  - Obtain records from [facility]   - MRI brain w/ without contrast  - Neuroendovascular consult  -Keep statin, ASA,plavix    #Chest tightness and palpitations  - Previous workup including TTE, stress test and TSH negative  - Collect urine for metanephrine ( r/o pheochromocytoma)  - Admit to telemetry  -Consider loop recorder if inpatient tele was nonrevealing    # Left retroorbital pain and injection  - R/o possible acute glaucoma?, r/o conjunctivitis  - Patient being evaluated by Ophthalmology     #HTN  Substitute Losartan for Valsartan    #DVT ppx  #Disposition: From home

## 2019-07-29 NOTE — H&P ADULT - HISTORY OF PRESENT ILLNESS
59 year old lady known to have HTN  presenting with palpitations, chest pressure and worsening neck pain.  Patient says while she was watching TV she had sudden onset of rapid hert beatss, and chest tightness that she felt was spreading to her left neck. This wasalso associated with transient pain behind her eye and blurry vision. She denies vision loss or numbness or weakness or LOC. No other neurologic complaints. No pain with eye movements. No eye discharge. No fever or recent illness.  While in ED, code stroke was called because of another episode of  left periorbital pain and left visual blurring. NIHSS was  zero. No acute intervention done.    Patient was admitted to [facility] last week for same compliants. According to patient, she was diagnosed with carotid artery dissection but had no procedures. She was discharged on ASA, plavix and Atorvastatin.  Review of charts showed patient had an admission and ED presentation last year for palpitations and  chest tightness. Workup including ECG, cardiac enzymes, TSH, TTE, stress test were negative.

## 2019-07-29 NOTE — H&P ADULT - NSHPLABSRESULTS_GEN_ALL_CORE
< from: CT Head No Cont (07.29.19 @ 04:33) >      No CT evidence for acute intracranial hemorrhage or large territorial   infarct.     < end of copied text >    < from: CT Angio Neck w/ IV Cont (07.28.19 @ 16:45) >      IMPRESSION:     1. Small size of the left internal carotid artery throughout with a small   left carotid canal. This is likely on the basis of congenital narrowing.   Normal left sided intracranial flow likely from collaterals.    2. Beaded appearance of the left internal carotid artery within the   petrous segment and mild fusiform aneurysmal dilatation of the distal   cervical right internal carotid artery during of to 6.6 mm. These   findings can be seen in the setting of fibromuscular dysplasia in the   appropriate clinical setting.    3. Recommend correlation with prior outside imaging given history of   carotid dissection.      < end of copied text >

## 2019-07-29 NOTE — CONSULT NOTE ADULT - SUBJECTIVE AND OBJECTIVE BOX
HPI:  60 y/o woman with PMH HTN presenting to ED c/o chest pain/palpitations and headache. Of note, pt was in [facility] last week and was diagnosed with left carotid artery dissection and was started on ASA+Plavix.  Today, pt experienced left periorbital pain and left visual blurring with conjunctival injection ipsilaterally. BP was elevated and successfully treated w/ Labetalol. Code stroke was called because of these symptoms.  CTA of the neck done prior to the stroke code revealed 6.6 mm dilatation of the right ICA and a beaded appearance of the left ICA w a diffusely small calibur. No dissection was noted.    PAST MEDICAL & SURGICAL HISTORY:  Carotid artery hypersensitivity  Palpitations  Hypertension  History of breast implant    Home Medications:  aspirin 81 mg oral tablet, chewable: 1 tab(s) orally once a day (05 Sep 2018 14:05)  Plavix 75 mg QD    Vital Signs Last 24 Hrs  T(C): 36.5 (28 Jul 2019 23:33), Max: 36.8 (28 Jul 2019 12:24)  T(F): 97.7 (28 Jul 2019 23:33), Max: 98.2 (28 Jul 2019 12:24)  HR: 83 (29 Jul 2019 04:36) (69 - 116)  BP: 127/78 (29 Jul 2019 04:36) (100/61 - 182/96)  BP(mean): --  RR: 18 (29 Jul 2019 03:33) (16 - 18)  SpO2: 98% (29 Jul 2019 03:33) (98% - 100%)    NIH Stroke Scale:   · NIH Stroke Scale: LOC	(0) Alert; keenly responsive	  · NIH Stroke Scale: LOC Question	(0) Answers both questions correctly	  · NIH Stroke Scale: LOC Command	(0) Performs both tasks correctly	  · NIH Stroke Scale: Gaze	(0) Normal	  · NIH Stroke Scale: Visual	(0) No visual loss	  · NIH Stroke Scale: Facial	(0) Normal symmetrical movements	  · NIH Stroke Scale: Arm Left	(0) No drift; limb holds 90 (or 45) degrees for full 10 secs	  · NIH Stroke Scale: Arm Right	(0) No drift; limb holds 90 (or 45) degrees for full 10 secs	  · NIH Stroke Scale: Leg Left	(0) No drift; leg holds 30 degree position for full 5 secs	  · NIH Stroke Scale: Leg Right	(0) No drift; leg holds 30 degree position for full 5 secs	  · NIH Stroke Scale: Ataxia	(0) Absent	  · NIH Stroke Scale: Sensory	(0) Normal; no sensory loss	  · NIH Stroke Scale: Language	(0) No aphasia; normal	  · NIH Stroke Scale: Dysarthria	(0) Normal	  · NIH Stroke Scale: Extinct Inattention	(0) No abnormality	  · NIH Stroke Scale: Total	0	    left conjunctiva injected    MRS: 0      Allergies    No Known Allergies    Intolerances      MEDICATIONS  (STANDING):  labetalol Injectable 5 milliGRAM(s) IV Push once    MEDICATIONS  (PRN):      LABS:                        12.0   6.11  )-----------( 214      ( 28 Jul 2019 13:20 )             36.0     07-28    144  |  106  |  16  ----------------------------<  96  4.2   |  27  |  0.7    Ca    9.5      28 Jul 2019 13:20    TPro  6.8  /  Alb  4.1  /  TBili  0.3  /  DBili  x   /  AST  15  /  ALT  16  /  AlkPhos  91  07-28      < from: CT Angio Neck w/ IV Cont (07.28.19 @ 16:45) >      Aortic arch is unremarkable in appearance. The subclavian arteries   demonstrate no significant stenosis.    The right common carotid artery is patent throughout without significant   stenosis.    There is atherosclerosis at the origin of the left common carotid artery   with mild focal stenosis. The left common carotid artery is small in size   compared with the right.    There is atherosclerosis at the right carotid bifurcation without   significant stenosis of the right internal carotid artery. There is focal   bulging/aneurysmal dilatation of the distal cervical right internal   carotid artery measuring up to 6.6 mm fusiform (series 12 image 138).   Remainder of the right internal carotid artery demonstrates no   significant stenosis.    There is mild atherosclerosis at the left carotid bifurcation. The left   internal carotid arteries miniscule in size throughout. There is a small   left carotid canal (series 4 image 2:30). There is a beaded appearance of   the left internal carotid artery within the petrous segment (series 4   image 236). The left internal carotid artery is small to the carotid   terminus. The left middle cerebral artery and anterior cerebral artery   demonstrate normal flow.    The left vertebral artery is slightly dominant. There is no significant   vertebral artery stenosis.    Bilateral posterior communicating arteries are seen. The basilar artery   and the posterior cerebral arteries are patent without significant   stenosis.    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      IMPRESSION:     1. Small size of the left internal carotid artery throughout with a small   left carotid canal. This is likely on the basis of congenital narrowing.   Normal left sided intracranial flow likely from collaterals.    2. Beaded appearance of the left internal carotid artery within the   petrous segment and mild fusiform aneurysmal dilatation of the distal   cervical right internal carotid artery during of to 6.6 mm. These   findings can be seen in the setting of fibromuscular dysplasia in the   appropriate clinical setting.    3. Recommend correlation with prior outside imaging given history of   carotid dissection.    < end of copied text >

## 2019-07-29 NOTE — ED CDU PROVIDER DISPOSITION NOTE - CLINICAL COURSE
pt placed in obs for acs r/o. obs course complicated by acute visual change, stroke code called, cth negative, fs wnl, neuro w/o acute recs. optho consulted for urgent evaluation. pt admitted to tele for further cards w/u, monitoring

## 2019-07-29 NOTE — CONSULT NOTE ADULT - SUBJECTIVE AND OBJECTIVE BOX
NILESH MOTLEY  MRN-[id number]  59y Female      Patient is a 59y old  Female who presents with a chief complaint of blurry vision OS  HEALTH ISSUES - R/O PROBLEM Dx:    HPI:      Extended HPI: Pt c/o blurry vison OS since this morning. Pt reports that initially it was painful but was seen this morning by ophtho resident and is not painful anymore. Pt has h/o possible left carotid artery dissection and diagnosed   (-)burning, itching, redness, tearing OD/OS  (-)flashes, floaters, eye pain OD/OS    PAST MEDICAL & SURGICAL HISTORY:  Palpitations  Hypertension  History of breast implant    MEDICATIONS  (STANDING):  artificial  tears Solution 1 Drop(s) Left EYE four times a day  aspirin  chewable 81 milliGRAM(s) Oral daily  atorvastatin 80 milliGRAM(s) Oral at bedtime  clopidogrel Tablet 75 milliGRAM(s) Oral daily  losartan 100 milliGRAM(s) Oral daily  ofloxacin 0.3% Solution 1 Drop(s) Left EYE four times a day    MEDICATIONS  (PRN):    Allergies  No Known Allergies    Intolerances  None      POHx:  FELIPE: 3 years (prior to this morning)  Refractive Error OU  (-)injuries/surgeries    Ocular Medications: none    FOHx: Non-contributory    VISUAL ACUITY  OD: sc 20/50 PH: 20/30-1  OS  sc 20/100  PH: 20/50    NEURO TESTING  Pupils: 	PERRL, (-) APD OD/OS  EOMS: 	FULL OD/OS  CVF: 	Full to red light OD/OS    ANTERIOR SEGMENT EVALUATION:   lids/lashes: 	clear OD/OS  conjunctiva: 	OD: clear; OS: trace injection  cornea:             OD: clear; OS: 0.4and6tl epi defect  anterior chamber: OD: deep & quiet; OS: deep, quiet  iris: 	flat and even OD/OS    INTRAOCULAR PRESSURE  Goldmann Applanation Tonometry  OD: 18mmHg  OS: 18 mmHg  @9:38am    POSTERIOR SEGMENT EVALUATION  Dilated: Tropicamide 1%, Phenylephrine 2.5% OD/OS  Lens: 		1-2+NS, 1+ cortical haze OD/OS  Vitreous: 	clear OD/OS  Optic nerve:	distinct, pink and healthy OD/OS  Macula: 	flat, even OD/OS  Vessels: 	2:3 OD/OS  Periphery: 	flat, (-)holes/breaks/tears 360 OD/OS NILESH MOTLEY  MRN-[id number]  59y Female      Patient is a 59y old  Female who presents with a chief complaint of blurry vision OS  HEALTH ISSUES - R/O PROBLEM Dx:    HPI: (from previous note) 60 y/o woman with PMH HTN presenting to ED c/o chest pain/palpitations and headache. Of note, pt was in [facility] last week and was diagnosed with left carotid artery dissection and was started on ASA+Plavix.  Today, pt experienced left periorbital pain and left visual blurring with conjunctival injection ipsilaterally. BP was elevated and successfully treated w/ Labetalol. Code stroke was called because of these symptoms.  CTA of the neck done prior to the stroke code revealed 6.6 mm dilatation of the right ICA and a beaded appearance of the left ICA w a diffusely small calibur. No dissection was noted.      Extended HPI: Pt c/o blurry vison OS since this morning. Pt reported to emergency room due to headache and chest pain and trainsient vision loss OS. Pt previously diagnosed with dilation of left ICA (noted at prior hospital visit to be dissection of left carotid artery). Per chart, blood pressure was elevated upon admission but is currently under control.    Pt reports that eye was blurry initially, then painful after examination. Pt was seen by optho resident in ED and reports that eye has significantly less pain at present.   (-)burning, itching, redness, tearing OD/OS  (-)flashes, floatersOD/OS    PAST MEDICAL & SURGICAL HISTORY:  Palpitations  Hypertension  History of breast implant    MEDICATIONS  (STANDING):  artificial  tears Solution 1 Drop(s) Left EYE four times a day  aspirin  chewable 81 milliGRAM(s) Oral daily  atorvastatin 80 milliGRAM(s) Oral at bedtime  clopidogrel Tablet 75 milliGRAM(s) Oral daily  losartan 100 milliGRAM(s) Oral daily  ofloxacin 0.3% Solution 1 Drop(s) Left EYE four times a day    MEDICATIONS  (PRN):    Allergies  No Known Allergies    Intolerances  None    POHx:  FELIPE: 3 years (prior to this morning)  Refractive Error OU  (-)injuries/surgeries    Ocular Medications: none    FOHx: Non-contributory    VISUAL ACUITY  OD: sc 20/50 PH: 20/30-1  OS  sc 20/100  PH: 20/50    NEURO TESTING  Pupils: 	PERRL, (-) APD OD/OS  EOMS: 	FULL OD/OS  CVF: 	Full to red light OD/OS    ANTERIOR SEGMENT EVALUATION:   lids/lashes: 	clear OD/OS  conjunctiva: 	OD: clear; OS: trace injection  cornea:             OD: clear; OS: 0.5idu2jq epi defect  anterior chamber: OD: deep & quiet; OS: deep, quiet  iris: 	flat and even OD/OS    INTRAOCULAR PRESSURE  Goldmann Applanation Tonometry  OD: 18mmHg  OS: 18 mmHg  @9:38am    POSTERIOR SEGMENT EVALUATION  Dilated: Tropicamide 1%, Phenylephrine 2.5% OD/OS  Lens: 		1-2+NS, 1+ cortical haze OD/OS  Vitreous: 	clear OD/OS  Optic nerve:	distinct, pink and healthy OD/OS  Macula: 	flat, even OD/OS  Vessels: 	2:3 OD/OS  Periphery: 	flat, (-)holes/breaks/tears 360 OD/OS NILESH MOTLEY  MRN-[id number]  59y Female      Patient is a 59y old  Female who presents with a chief complaint of blurry vision OS  HEALTH ISSUES - R/O PROBLEM Dx:    HPI: (from previous note) 58 y/o woman with PMH HTN presenting to ED c/o chest pain/palpitations and headache. Of note, pt was in [facility] last week and was diagnosed with left carotid artery dissection and was started on ASA+Plavix.  Today, pt experienced left periorbital pain and left visual blurring with conjunctival injection ipsilaterally. BP was elevated and successfully treated w/ Labetalol. Code stroke was called because of these symptoms.  CTA of the neck done prior to the stroke code revealed 6.6 mm dilatation of the right ICA and a beaded appearance of the left ICA w a diffusely small calibur. No dissection was noted.      Extended HPI: Pt c/o blurry vison OS since this morning. Pt reported to emergency room due to headache and chest pain and trainsient vision loss OS. Pt previously diagnosed with dilation of left ICA (noted at prior hospital visit to be dissection of left carotid artery). Per chart, blood pressure was elevated upon admission but is currently under control.    Pt reports that eye was blurry initially, then painful after examination. Pt was seen by optho resident in ED and reports that eye has significantly less pain at present.   (-)burning, itching, redness, tearing OD/OS  (-)flashes, floatersOD/OS    PAST MEDICAL & SURGICAL HISTORY:  Palpitations  Hypertension  History of breast implant    MEDICATIONS  (STANDING):  artificial  tears Solution 1 Drop(s) Left EYE four times a day  aspirin  chewable 81 milliGRAM(s) Oral daily  atorvastatin 80 milliGRAM(s) Oral at bedtime  clopidogrel Tablet 75 milliGRAM(s) Oral daily  losartan 100 milliGRAM(s) Oral daily  ofloxacin 0.3% Solution 1 Drop(s) Left EYE four times a day    MEDICATIONS  (PRN):    Allergies  No Known Allergies    Intolerances  None    POHx:  FELIPE: 3 years (prior to this morning)  Refractive Error OU  (-)injuries/surgeries    Ocular Medications: none    FOHx: Non-contributory    VISUAL ACUITY  OD: sc 20/50 PH: 20/30-1  OS  sc 20/100  PH: 20/50    NEURO TESTING  Pupils: 	pharmologically diated OU, non-reactive  EOMS: 	FULL OD/OS  CVF: 	Full to red light OD/OS    ANTERIOR SEGMENT EVALUATION:   lids/lashes: 	clear OD/OS  conjunctiva: 	OD: clear; OS: trace injection  cornea:             OD: clear; OS: 0.0gzf1pu epi defect  anterior chamber: OD: deep & quiet; OS: deep, quiet  iris: 	flat and even OD/OS    INTRAOCULAR PRESSURE  Goldmann Applanation Tonometry  OD: 18mmHg  OS: 18 mmHg  @9:38am    POSTERIOR SEGMENT EVALUATION  Dilated: Tropicamide 1%, Phenylephrine 2.5% OD/OS  Lens: 		1-2+NS, 1+ cortical haze OD/OS  Vitreous: 	clear OD/OS  Optic nerve:	distinct, pink and healthy OD/OS  Macula: 	flat, even OD/OS  Vessels: 	2:3 OD/OS  Periphery: 	flat, (-)holes/breaks/tears 360 OD/OS

## 2019-07-29 NOTE — ED ADULT NURSE REASSESSMENT NOTE - NS ED NURSE REASSESS COMMENT FT1
Pt. noted with elevated BP @ 0333 of 182/96 and took personal supply of Valsartan @ 0340. Pt then complaining of sudden palpitations, noted Tachycardic with HR ranging 110-116, complaining of left eye vision loss. Patient awake and alert and able to make needs known. Pt stated that she had experienced a similar occurrence in the past when BP was elevated. As per pt. when she takes valsartan, symptoms resolve. Labetalol 5mg/ml administered via IV @ 0343 and order for Reglan 10mg administered @ 348 per MD orders. Pt. also noted complaining of left facial paresthesia. No complaints of headache or dizziness. Pt noted with left sided weakness to upper and lower extremity. Upon assessment pt. noted with left sided facial swelling and left eye redness. Repeat BP s/p Labetalol administration 139/85, HR 79. Stroke Code called @ 0403. Neuro PA at bedside. GCS 15, NIH 0. Pupils equal and reactive. Pt taken for CT @ 0430, order for CRP and Sed Rate drawn and sent to lab. Continues on cardiac monitoring. Pt. noted with elevated BP @ 0333 of 182/96 and took personal supply of Valsartan @ 0340. Pt then complaining of sudden palpitations, noted Tachycardic with HR ranging 110-116, complaining of left eye vision loss. Patient awake and alert and able to make needs known. Pt stated that she had experienced a similar occurrence in the past when BP was elevated. As per pt. when she takes valsartan, symptoms resolve. Labetalol 5mg/ml administered via IV @ 0343 and order for Reglan 10mg administered @ 348 per MD orders. Pt. also noted complaining of left facial paresthesia. No complaints of headache or dizziness. Pt complaining of left sided weakness to upper and lower extremity. Upon assessment pt. noted with left sided facial swelling and left eye redness. Repeat BP s/p Labetalol administration 139/85, HR 79. Stroke Code called @ 0403. Neuro PA at bedside. GCS 15, NIH 0. Pupils equal and reactive. Pt taken for CT @ 0430, order for CRP and Sed Rate drawn and sent to lab. Continues on cardiac monitoring. Pt. noted with elevated BP @ 0333 of 182/96 and took personal supply of Valsartan @ 0340. Pt. was asleep prior, resting comfortably with no complaints. Pt then complaining of sudden palpitations, noted Tachycardic with HR ranging 110-116, complaining of left eye vision loss. Patient awake and alert and able to make needs known. Pt stated that she had experienced a similar occurrence in the past when BP was elevated. As per pt. when she takes valsartan, symptoms resolve. Labetalol 5mg/ml administered via IV @ 0343 and order for Reglan 10mg administered @ 348 per MD orders. Pt. also noted complaining of left facial paresthesia. No complaints of headache or dizziness. Pt complaining of left sided weakness to upper and lower extremity. Upon assessment pt. noted with left sided facial swelling and left eye redness. Repeat BP s/p Labetalol administration 139/85, HR 79. Stroke Code called @ 0403. Neuro PA at bedside. GCS 15, NIH 0. Pupils equal and reactive. Pt taken for CT @ 0430, order for CRP and Sed Rate drawn and sent to lab. Continues on cardiac monitoring.

## 2019-07-30 LAB
ALBUMIN SERPL ELPH-MCNC: 3.9 G/DL — SIGNIFICANT CHANGE UP (ref 3.5–5.2)
ALP SERPL-CCNC: 91 U/L — SIGNIFICANT CHANGE UP (ref 30–115)
ALT FLD-CCNC: 15 U/L — SIGNIFICANT CHANGE UP (ref 0–41)
ANION GAP SERPL CALC-SCNC: 9 MMOL/L — SIGNIFICANT CHANGE UP (ref 7–14)
AST SERPL-CCNC: 16 U/L — SIGNIFICANT CHANGE UP (ref 0–41)
BASOPHILS # BLD AUTO: 0.03 K/UL — SIGNIFICANT CHANGE UP (ref 0–0.2)
BASOPHILS NFR BLD AUTO: 0.6 % — SIGNIFICANT CHANGE UP (ref 0–1)
BILIRUB SERPL-MCNC: 0.4 MG/DL — SIGNIFICANT CHANGE UP (ref 0.2–1.2)
BUN SERPL-MCNC: 10 MG/DL — SIGNIFICANT CHANGE UP (ref 10–20)
CALCIUM SERPL-MCNC: 9.6 MG/DL — SIGNIFICANT CHANGE UP (ref 8.5–10.1)
CHLORIDE SERPL-SCNC: 108 MMOL/L — SIGNIFICANT CHANGE UP (ref 98–110)
CO2 SERPL-SCNC: 27 MMOL/L — SIGNIFICANT CHANGE UP (ref 17–32)
CREAT SERPL-MCNC: 0.7 MG/DL — SIGNIFICANT CHANGE UP (ref 0.7–1.5)
EOSINOPHIL # BLD AUTO: 0.44 K/UL — SIGNIFICANT CHANGE UP (ref 0–0.7)
EOSINOPHIL NFR BLD AUTO: 8.4 % — HIGH (ref 0–8)
GLUCOSE SERPL-MCNC: 89 MG/DL — SIGNIFICANT CHANGE UP (ref 70–99)
HCT VFR BLD CALC: 36.2 % — LOW (ref 37–47)
HGB BLD-MCNC: 12 G/DL — SIGNIFICANT CHANGE UP (ref 12–16)
IMM GRANULOCYTES NFR BLD AUTO: 0.2 % — SIGNIFICANT CHANGE UP (ref 0.1–0.3)
LYMPHOCYTES # BLD AUTO: 2.01 K/UL — SIGNIFICANT CHANGE UP (ref 1.2–3.4)
LYMPHOCYTES # BLD AUTO: 38.2 % — SIGNIFICANT CHANGE UP (ref 20.5–51.1)
MCHC RBC-ENTMCNC: 30.1 PG — SIGNIFICANT CHANGE UP (ref 27–31)
MCHC RBC-ENTMCNC: 33.1 G/DL — SIGNIFICANT CHANGE UP (ref 32–37)
MCV RBC AUTO: 90.7 FL — SIGNIFICANT CHANGE UP (ref 81–99)
MONOCYTES # BLD AUTO: 0.48 K/UL — SIGNIFICANT CHANGE UP (ref 0.1–0.6)
MONOCYTES NFR BLD AUTO: 9.1 % — SIGNIFICANT CHANGE UP (ref 1.7–9.3)
NEUTROPHILS # BLD AUTO: 2.29 K/UL — SIGNIFICANT CHANGE UP (ref 1.4–6.5)
NEUTROPHILS NFR BLD AUTO: 43.5 % — SIGNIFICANT CHANGE UP (ref 42.2–75.2)
NRBC # BLD: 0 /100 WBCS — SIGNIFICANT CHANGE UP (ref 0–0)
PLATELET # BLD AUTO: 206 K/UL — SIGNIFICANT CHANGE UP (ref 130–400)
POTASSIUM SERPL-MCNC: 3.9 MMOL/L — SIGNIFICANT CHANGE UP (ref 3.5–5)
POTASSIUM SERPL-SCNC: 3.9 MMOL/L — SIGNIFICANT CHANGE UP (ref 3.5–5)
PROT SERPL-MCNC: 6.5 G/DL — SIGNIFICANT CHANGE UP (ref 6–8)
RBC # BLD: 3.99 M/UL — LOW (ref 4.2–5.4)
RBC # FLD: 12.3 % — SIGNIFICANT CHANGE UP (ref 11.5–14.5)
SODIUM SERPL-SCNC: 144 MMOL/L — SIGNIFICANT CHANGE UP (ref 135–146)
WBC # BLD: 5.26 K/UL — SIGNIFICANT CHANGE UP (ref 4.8–10.8)
WBC # FLD AUTO: 5.26 K/UL — SIGNIFICANT CHANGE UP (ref 4.8–10.8)

## 2019-07-30 PROCEDURE — 99233 SBSQ HOSP IP/OBS HIGH 50: CPT

## 2019-07-30 PROCEDURE — 93010 ELECTROCARDIOGRAM REPORT: CPT

## 2019-07-30 PROCEDURE — 70553 MRI BRAIN STEM W/O & W/DYE: CPT | Mod: 26

## 2019-07-30 RX ORDER — ACETAMINOPHEN 500 MG
650 TABLET ORAL EVERY 6 HOURS
Refills: 0 | Status: DISCONTINUED | OUTPATIENT
Start: 2019-07-30 | End: 2019-07-31

## 2019-07-30 RX ADMIN — Medication 1 DROP(S): at 05:55

## 2019-07-30 RX ADMIN — Medication 1 DROP(S): at 23:21

## 2019-07-30 RX ADMIN — ATORVASTATIN CALCIUM 80 MILLIGRAM(S): 80 TABLET, FILM COATED ORAL at 21:38

## 2019-07-30 RX ADMIN — CLOPIDOGREL BISULFATE 75 MILLIGRAM(S): 75 TABLET, FILM COATED ORAL at 11:06

## 2019-07-30 RX ADMIN — Medication 1 DROP(S): at 18:22

## 2019-07-30 RX ADMIN — Medication 650 MILLIGRAM(S): at 09:50

## 2019-07-30 RX ADMIN — Medication 1 DROP(S): at 11:05

## 2019-07-30 RX ADMIN — Medication 81 MILLIGRAM(S): at 11:08

## 2019-07-30 RX ADMIN — ENOXAPARIN SODIUM 40 MILLIGRAM(S): 100 INJECTION SUBCUTANEOUS at 11:04

## 2019-07-30 RX ADMIN — LOSARTAN POTASSIUM 100 MILLIGRAM(S): 100 TABLET, FILM COATED ORAL at 05:55

## 2019-07-30 NOTE — PROVIDER CONTACT NOTE (OTHER) - ACTION/TREATMENT ORDERED:
MD coming to assess pt, EKG ordered. and CXR to be ordered and will order acetaminophen for headache.

## 2019-07-30 NOTE — PROGRESS NOTE ADULT - ATTENDING COMMENTS
#Progress Note Handoff    Pending (specify):  MRI brain, Neurovascular followed by Neurology   Family discussion: D/W Family at the bed side   Disposition: Home

## 2019-07-30 NOTE — PROVIDER CONTACT NOTE (OTHER) - ASSESSMENT
PT assessed A&OX3, pt in Normal sinus rhythm on cardiac monitor. /78 HR 84. PT c/o chest pain , headache and left sided facial heaviness.

## 2019-07-31 ENCOUNTER — TRANSCRIPTION ENCOUNTER (OUTPATIENT)
Age: 59
End: 2019-07-31

## 2019-07-31 VITALS
DIASTOLIC BLOOD PRESSURE: 72 MMHG | RESPIRATION RATE: 18 BRPM | SYSTOLIC BLOOD PRESSURE: 121 MMHG | HEART RATE: 82 BPM | TEMPERATURE: 96 F

## 2019-07-31 LAB
ALBUMIN SERPL ELPH-MCNC: 4 G/DL — SIGNIFICANT CHANGE UP (ref 3.5–5.2)
ALP SERPL-CCNC: 90 U/L — SIGNIFICANT CHANGE UP (ref 30–115)
ALT FLD-CCNC: 13 U/L — SIGNIFICANT CHANGE UP (ref 0–41)
ANION GAP SERPL CALC-SCNC: 10 MMOL/L — SIGNIFICANT CHANGE UP (ref 7–14)
AST SERPL-CCNC: 15 U/L — SIGNIFICANT CHANGE UP (ref 0–41)
BASOPHILS # BLD AUTO: 0.04 K/UL — SIGNIFICANT CHANGE UP (ref 0–0.2)
BASOPHILS NFR BLD AUTO: 0.6 % — SIGNIFICANT CHANGE UP (ref 0–1)
BILIRUB SERPL-MCNC: 0.4 MG/DL — SIGNIFICANT CHANGE UP (ref 0.2–1.2)
BUN SERPL-MCNC: 11 MG/DL — SIGNIFICANT CHANGE UP (ref 10–20)
CALCIUM SERPL-MCNC: 9.4 MG/DL — SIGNIFICANT CHANGE UP (ref 8.5–10.1)
CHLORIDE SERPL-SCNC: 105 MMOL/L — SIGNIFICANT CHANGE UP (ref 98–110)
CO2 SERPL-SCNC: 28 MMOL/L — SIGNIFICANT CHANGE UP (ref 17–32)
CREAT SERPL-MCNC: 0.7 MG/DL — SIGNIFICANT CHANGE UP (ref 0.7–1.5)
EOSINOPHIL # BLD AUTO: 0.66 K/UL — SIGNIFICANT CHANGE UP (ref 0–0.7)
EOSINOPHIL NFR BLD AUTO: 9.6 % — HIGH (ref 0–8)
GLUCOSE SERPL-MCNC: 92 MG/DL — SIGNIFICANT CHANGE UP (ref 70–99)
HCT VFR BLD CALC: 37.9 % — SIGNIFICANT CHANGE UP (ref 37–47)
HCV AB S/CO SERPL IA: 0.08 S/CO — SIGNIFICANT CHANGE UP (ref 0–0.99)
HCV AB SERPL-IMP: SIGNIFICANT CHANGE UP
HGB BLD-MCNC: 13 G/DL — SIGNIFICANT CHANGE UP (ref 12–16)
IMM GRANULOCYTES NFR BLD AUTO: 0.3 % — SIGNIFICANT CHANGE UP (ref 0.1–0.3)
LYMPHOCYTES # BLD AUTO: 2.44 K/UL — SIGNIFICANT CHANGE UP (ref 1.2–3.4)
LYMPHOCYTES # BLD AUTO: 35.5 % — SIGNIFICANT CHANGE UP (ref 20.5–51.1)
MCHC RBC-ENTMCNC: 31.3 PG — HIGH (ref 27–31)
MCHC RBC-ENTMCNC: 34.3 G/DL — SIGNIFICANT CHANGE UP (ref 32–37)
MCV RBC AUTO: 91.1 FL — SIGNIFICANT CHANGE UP (ref 81–99)
MONOCYTES # BLD AUTO: 0.34 K/UL — SIGNIFICANT CHANGE UP (ref 0.1–0.6)
MONOCYTES NFR BLD AUTO: 4.9 % — SIGNIFICANT CHANGE UP (ref 1.7–9.3)
NEUTROPHILS # BLD AUTO: 3.38 K/UL — SIGNIFICANT CHANGE UP (ref 1.4–6.5)
NEUTROPHILS NFR BLD AUTO: 49.1 % — SIGNIFICANT CHANGE UP (ref 42.2–75.2)
NRBC # BLD: 0 /100 WBCS — SIGNIFICANT CHANGE UP (ref 0–0)
PLATELET # BLD AUTO: 211 K/UL — SIGNIFICANT CHANGE UP (ref 130–400)
POTASSIUM SERPL-MCNC: 3.9 MMOL/L — SIGNIFICANT CHANGE UP (ref 3.5–5)
POTASSIUM SERPL-SCNC: 3.9 MMOL/L — SIGNIFICANT CHANGE UP (ref 3.5–5)
PROT SERPL-MCNC: 6.5 G/DL — SIGNIFICANT CHANGE UP (ref 6–8)
RBC # BLD: 4.16 M/UL — LOW (ref 4.2–5.4)
RBC # FLD: 12.3 % — SIGNIFICANT CHANGE UP (ref 11.5–14.5)
SODIUM SERPL-SCNC: 143 MMOL/L — SIGNIFICANT CHANGE UP (ref 135–146)
WBC # BLD: 6.88 K/UL — SIGNIFICANT CHANGE UP (ref 4.8–10.8)
WBC # FLD AUTO: 6.88 K/UL — SIGNIFICANT CHANGE UP (ref 4.8–10.8)

## 2019-07-31 PROCEDURE — 99232 SBSQ HOSP IP/OBS MODERATE 35: CPT

## 2019-07-31 PROCEDURE — 99239 HOSP IP/OBS DSCHRG MGMT >30: CPT

## 2019-07-31 RX ORDER — OFLOXACIN 0.3 %
1 DROPS OPHTHALMIC (EYE)
Qty: 2 | Refills: 0
Start: 2019-07-31 | End: 2019-08-29

## 2019-07-31 RX ADMIN — Medication 1 DROP(S): at 04:46

## 2019-07-31 RX ADMIN — ENOXAPARIN SODIUM 40 MILLIGRAM(S): 100 INJECTION SUBCUTANEOUS at 12:14

## 2019-07-31 RX ADMIN — Medication 1 DROP(S): at 12:14

## 2019-07-31 RX ADMIN — Medication 1 DROP(S): at 17:18

## 2019-07-31 RX ADMIN — LOSARTAN POTASSIUM 100 MILLIGRAM(S): 100 TABLET, FILM COATED ORAL at 04:45

## 2019-07-31 NOTE — DISCHARGE NOTE PROVIDER - NSDCCPCAREPLAN_GEN_ALL_CORE_FT
PRINCIPAL DISCHARGE DIAGNOSIS  Diagnosis: Fibromuscular dysplasia  Assessment and Plan of Treatment: Continue medical management. Follow up Neurology in 2 weeks      SECONDARY DISCHARGE DIAGNOSES  Diagnosis: Chest pain  Assessment and Plan of Treatment: No further management    Diagnosis: Acute visual loss, left  Assessment and Plan of Treatment: Neurology follow up. Corneal abbretion.

## 2019-07-31 NOTE — PROGRESS NOTE ADULT - SUBJECTIVE AND OBJECTIVE BOX
NILESH MOTLEY    Chief Complaint:    Handed    HPI:  59 year old lady known to have HTN  presenting with palpitations, chest pressure and worsening neck pain.  Patient says while she was watching TV she had sudden onset of rapid hert beatss, and chest tightness that she felt was spreading to her left neck. This wasalso associated with transient pain behind her eye and blurry vision. She denies vision loss or numbness or weakness or LOC. No other neurologic complaints. No pain with eye movements. No eye discharge. No fever or recent illness.  While in ED, code stroke was called because of another episode of  left periorbital pain and left visual blurring. NIHSS was  zero. No acute intervention done.    Patient was admitted to [facility] last week for same compliants. According to patient, she was diagnosed with carotid artery dissection but had no procedures. She was discharged on ASA, plavix and Atorvastatin.  Review of charts showed patient had an admission and ED presentation last year for palpitations and  chest tightness. Workup including ECG, cardiac enzymes, TSH, TTE, stress test were negative. (29 Jul 2019 08:33)    Interval : Patient is examined at the bedside. She reports no current symptoms, NIHSS is 0. MRI is negative for acute stroke.      Relevant PMH:  [] Prior ischemic stroke/TIA  [] Afib  []CAD  []HTN  []DLD  []DM []PVD []Obesity [] Sedintary lifestyle []CHF  []CARLOZ  []Cancer Hx     Social History: [] Smoking []  Drug Use: []   Alcohol Use:   [] Other:      Possible Location of Stroke:    Possible Cause of Stroke:    Relevant Cerebral Imaging:    Relevant Cervicocerebral Imaging:  CT Angio Neck w/ IV Cont:   EXAM:  CT ANGIO NECK (W)AW IC            PROCEDURE DATE:  07/28/2019            INTERPRETATION:  CLINICAL HISTORY / REASON FOR EXAM: Headache. History of   left carotid artery dissection    TECHNIQUE: CTA of the neck was performed following the intravenous   administration of 70 cc Optiray 350 contrast. Sagittal, coronal and axial   reformatted images were obtained as well as 3-D volume rendered images.    COMPARISON: Noncontrast CT head 9/2/2018.    FINDINGS:      Aortic arch is unremarkable in appearance. The subclavian arteries   demonstrate no significant stenosis.    The right common carotid artery is patent throughout without significant   stenosis.    There is atherosclerosis at the origin of the left common carotid artery   with mild focal stenosis. The left common carotid artery is small in size   compared with the right.    There is atherosclerosis at the right carotid bifurcation without   significant stenosis of the right internal carotid artery. There is focal   bulging/aneurysmal dilatation of the distal cervical right internal   carotid artery measuring up to 6.6 mm fusiform (series 12 image 138).   Remainder of the right internal carotid artery demonstrates no   significant stenosis.    There is mild atherosclerosis at the left carotid bifurcation. The left   internal carotid arteries miniscule in size throughout. There is a small   left carotid canal (series 4 image 2:30). There is a beaded appearance of   the left internal carotid artery within the petrous segment (series 4   image 236). The left internal carotid artery is small to the carotid   terminus. The left middle cerebral artery and anterior cerebral artery   demonstrate normal flow.    The left vertebral artery is slightly dominant. There is no significant   vertebral artery stenosis.    Bilateral posterior communicating arteries are seen. The basilar artery   and the posterior cerebral arteries are patent without significant   stenosis.    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      IMPRESSION:     1. Small size of the left internal carotid artery throughout with a small   left carotid canal. This is likely on the basis of congenital narrowing.   Normal left sided intracranial flow likely from collaterals.    2. Beaded appearance of the left internal carotid artery within the   petrous segment and mild fusiform aneurysmal dilatation of the distal   cervical right internal carotid artery during of to 6.6 mm. These   findings can be seen in the setting of fibromuscular dysplasia in the   appropriate clinical setting.    3. Recommend correlation with prior outside imaging given history of   carotid dissection.              CATHRYN KAUR M.D., RESIDENT RADIOLOGIST  This document has been electronically signed.  CATHRYN EVANS M.D., ATTENDING RADIOLOGIST  This document has been electronically signed. Jul 28 2019  7:30PM             (07-28-19 @ 16:45)          Relevant blood tests:      Relevant cardiac rhythm monitoring:    Relevant Cardiac Structure:(TTE/PARTH +/-):[]No intracardiac thrombus/[] no vegetation/[]no [place]/EF:      Home Medications:  aspirin 81 mg oral tablet, chewable: 1 tab(s) orally once a day (29 Jul 2019 08:54)  atorvastatin 80 mg oral tablet: 1 tab(s) orally once a day (29 Jul 2019 08:54)  Plavix 75 mg oral tablet: 1 tab(s) orally once a day (29 Jul 2019 08:54)  valsartan 160 mg oral capsule: 1 cap(s) orally once a day (29 Jul 2019 08:54)      MEDICATIONS  (STANDING):  artificial  tears Solution 1 Drop(s) Left EYE four times a day  aspirin  chewable 81 milliGRAM(s) Oral daily  atorvastatin 80 milliGRAM(s) Oral at bedtime  clopidogrel Tablet 75 milliGRAM(s) Oral daily  enoxaparin Injectable 40 milliGRAM(s) SubCutaneous daily  losartan 100 milliGRAM(s) Oral daily  ofloxacin 0.3% Solution 1 Drop(s) Left EYE four times a day      PT/OT/Speech/Rehab/S&Swr:    Exam:    Vital Signs Last 24 Hrs  T(C): 35.6 (31 Jul 2019 13:48), Max: 36.4 (30 Jul 2019 16:02)  T(F): 96 (31 Jul 2019 13:48), Max: 97.6 (31 Jul 2019 05:38)  HR: 82 (31 Jul 2019 13:48) (63 - 82)  BP: 121/72 (31 Jul 2019 13:48) (117/72 - 134/72)  BP(mean): --  RR: 18 (31 Jul 2019 13:48) (18 - 18)  SpO2: --    NIHSS      LOC:       1a: 0    1b(Questions): 0          1c(Instructions): 0            Best Gaze:0  Visual:0  Motor:                 RUE:  0   RLE:  0   LUE: 0    LLE: 0    FACE: 0    Limb Ataxia:0  Sensory:  0     Language: 0      Dysarthria:  0        Extinction and Inattention:0    NIHSS on admission:          NIHSS yesterday:          NIHSS today:  0           m-RS: 0    Impression:      Suggestion:  Routine stroke workup including:    Disposition:

## 2019-07-31 NOTE — PROGRESS NOTE ADULT - SUBJECTIVE AND OBJECTIVE BOX
Pt seen and examined at bedside. She reports palpitations lasting for ten minutes, however no events on telemetry monitor to correlate with symptoms. Denies headache, weight loss, fevers, rash, joint pain.     VITAL SIGNS (Last 24 hrs):  T(C): 35.6 (19 @ 13:48), Max: 36.4 (19 @ 16:02)  HR: 82 (19 @ 13:48) (63 - 82)  BP: 121/72 (19 @ 13:48) (117/72 - 134/72)  RR: 18 (19 @ 13:48) (18 - 18)  SpO2: --  Wt(kg): --  Daily Height in cm: 152.4 (2019 16:02)    Daily Weight in k.4 (2019 05:38)    I&O's Summary    2019 07:  -  2019 07:00  --------------------------------------------------------  IN: 400 mL / OUT: 0 mL / NET: 400 mL    2019 07:01  -  2019 14:23  --------------------------------------------------------  IN: 220 mL / OUT: 300 mL / NET: -80 mL        PHYSICAL EXAM:  GENERAL: NAD, well-developed  HEAD:  Atraumatic, Normocephalic  EYES: conjunctiva and sclera clear  NECK: Supple, No JVD  CHEST/LUNG: Clear to auscultation bilaterally; No wheeze  HEART: Regular rate and rhythm; No murmurs, rubs, or gallops  ABDOMEN: Soft, Nontender, Nondistended; Bowel sounds present  EXTREMITIES:  2+ Peripheral Pulses, No clubbing, cyanosis, or edema  PSYCH: AAOx3  NEUROLOGY: non-focal  SKIN: No rashes or lesions    Labs Reviewed  Spoke to patient in regards to labs.    CBC Full  -  ( 2019 05:45 )  WBC Count : 6.88 K/uL  Hemoglobin : 13.0 g/dL  Hematocrit : 37.9 %  Platelet Count - Automated : 211 K/uL  Mean Cell Volume : 91.1 fL  Mean Cell Hemoglobin : 31.3 pg  Mean Cell Hemoglobin Concentration : 34.3 g/dL  Auto Neutrophil # : 3.38 K/uL  Auto Lymphocyte # : 2.44 K/uL  Auto Monocyte # : 0.34 K/uL  Auto Eosinophil # : 0.66 K/uL  Auto Basophil # : 0.04 K/uL  Auto Neutrophil % : 49.1 %  Auto Lymphocyte % : 35.5 %  Auto Monocyte % : 4.9 %  Auto Eosinophil % : 9.6 %  Auto Basophil % : 0.6 %    BMP:     @ 05:45    Blood Urea Nitrogen - 11  Calcium - 9.4  Carbon Dioxide - 28  Chloride - 105  Creatinine - 0.7  Glucose - 92  Potassium - 3.9  Sodium - 143     MEDICATIONS  (STANDING):  artificial  tears Solution 1 Drop(s) Left EYE four times a day  aspirin  chewable 81 milliGRAM(s) Oral daily  atorvastatin 80 milliGRAM(s) Oral at bedtime  clopidogrel Tablet 75 milliGRAM(s) Oral daily  enoxaparin Injectable 40 milliGRAM(s) SubCutaneous daily  losartan 100 milliGRAM(s) Oral daily  ofloxacin 0.3% Solution 1 Drop(s) Left EYE four times a day    MEDICATIONS  (PRN):  acetaminophen   Tablet .. 650 milliGRAM(s) Oral every 6 hours PRN Mild Pain (1 - 3)

## 2019-07-31 NOTE — PROGRESS NOTE ADULT - ASSESSMENT
NILESH MOTLEY 59y Female  MRN#: [id number]   CODE STATUS:_full codee     SUBJECTIVE  Patient is a 59y old Female who presents with a chief complaint of Currently admitted to medicine with neck pain and headache .   Today is hospital day 1d, and this morning she is resting in the bed and reports mild on/off neck pain overnight.     OBJECTIVE  PAST MEDICAL & SURGICAL HISTORY  Palpitations  Hypertension  History of breast implant    ALLERGIES:  No Known Allergies    MEDICATIONS:  STANDING MEDICATIONS  artificial  tears Solution 1 Drop(s) Left EYE four times a day  aspirin  chewable 81 milliGRAM(s) Oral daily  atorvastatin 80 milliGRAM(s) Oral at bedtime  clopidogrel Tablet 75 milliGRAM(s) Oral daily  enoxaparin Injectable 40 milliGRAM(s) SubCutaneous daily  losartan 100 milliGRAM(s) Oral daily  ofloxacin 0.3% Solution 1 Drop(s) Left EYE four times a day    PRN MEDICATIONS  acetaminophen   Tablet .. 650 milliGRAM(s) Oral every 6 hours PRN      VITAL SIGNS: Last 24 Hours  T(C): 36.6 (30 Jul 2019 08:03), Max: 36.8 (29 Jul 2019 12:14)  T(F): 97.8 (30 Jul 2019 08:03), Max: 98.3 (29 Jul 2019 12:14)  HR: 84 (30 Jul 2019 08:40) (65 - 84)  BP: 138/78 (30 Jul 2019 08:40) (100/68 - 140/85)  BP(mean): --  RR: 19 (30 Jul 2019 08:40) (18 - 19)  SpO2: 100% (30 Jul 2019 08:40) (99% - 100%)    LABS:                        12.0   5.26  )-----------( 206      ( 30 Jul 2019 05:23 )             36.2     07-30    144  |  108  |  10  ----------------------------<  89  3.9   |  27  |  0.7    Ca    9.6      30 Jul 2019 05:23    TPro  6.5  /  Alb  3.9  /  TBili  0.4  /  DBili  x   /  AST  16  /  ALT  15  /  AlkPhos  91  07-30              CARDIAC MARKERS ( 28 Jul 2019 20:25 )  x     / <0.01 ng/mL / x     / x     / x      CARDIAC MARKERS ( 28 Jul 2019 13:20 )  x     / <0.01 ng/mL / x     / x     / x          RADIOLOGY:  CT Angio Neck w/ IV Cont (07.28.19 @ 16:45) >    1. Small size of the left internal carotid artery throughout with a small   left carotid canal. This is likely on the basis of congenital narrowing.   Normal left sided intracranial flow likely from collaterals.    2. Beaded appearance of the left internal carotid artery within the   petrous segment and mild fusiform aneurysmal dilatation of the distal   cervical right internal carotid artery during of to 6.6 mm. These   findings can be seen in the setting of fibromuscular dysplasia in the   appropriate clinical setting.    3. Recommend correlation with prior outside imaging given history of   carotid dissection.      PHYSICAL EXAM:  ASSESSMENT & PLAN  59 year old female with history of HTN presented with neck pain and headache.     # neck pain and headache, CTA showed  Beaded appearance of the left internal carotid artery   - s/p Code stroke.NIHSS =0  -CTA of the neck done prior to the stroke code revealed beaded appearance of the left ICA ( r/o fibromascular dysplasia) . No dissection   - Recent diagniosis of left ICA dissection? @ [facility]   - MRI brain w/ without contrast  - Neuroendovascular consult  - Keep statin, ASA,plavix    #Chest tightness and palpitations  - Previous workup including TTE, stress test and TSH negative  - Collect urine for metanephrine ( r/o pheochromocytoma)    # Left retroorbital pain and injection  - seen by opthalmology   - f/u recommendations     #HTN, controlled   c/w Valsartan    #DVT ppx  #Disposition: From home
58 yo female with pmh as stated above presented with left sided numbness and palpitations. Her cardiac work up is negative. MRI is negative for acute stroke. CTA s/o fibromuscular dysplasia.    Plan:  Outpatient f/u with neurology in 2 weeks  Continue Aspirin, Plavix, Statin        Neuroattending note will follow
59 year old female with history of HTN presented with palpitations, chest pressure radiating to the neck.     # Palpitations   - TSH wnl   - no events on telemetry (reported 10 minute long episode of palpitations this morning)   - spoke with outpatient cardiologist Dr. Villarreal who reported patient had outpatient 5 day ambulatory cardiac monitor which was negative. He suspected her symptoms to be related to anxiety.   - no further cardiac work up warranted     # Neck Pain   - cardiologist reports patient was found to have mild to moderate stenosis of the ICA on carotid duplex. Subsequently, she went to [facility] where she was diagnosed with ICA dissection.   - CTA of the neck revealed beaded appearance of the left ICA   - neuroendovascular follow up regarding the above finding, if no further work up warranted patient can be discharged today     # Transient vision loss - possible TIA vs complex migraine, MRI brain negative     # Left retroorbital pain and injection 2/2 corneal abrasion   - c/w eye drops     #HTN, controlled   - c/w Valsartan    #DVT ppx  #Disposition: From home
60 y/o woman with PMH HTN presented to ED c/o chest pain/palpitations and headache. Of note, pt was in [facility] last week and was diagnosed with left carotid artery dissection and was started on ASA+Plavix. pt experienced left periorbital pain and left visual blurring with conjunctival injection ipsilaterally. BP was elevated and successfully treated w/ Labetalol. Code stroke was called because of these symptoms. CTA of the neck done prior to the stroke code revealed 6.6 mm dilatation of the right ICA and a beaded appearance of the left ICA w a diffusely small calibur. No dissection was noted. Patient currently on telemetry with no acute complaints . NIHSS 0    Plan:  Follow up pending official read of MRI brain  follow up CRP  Neuroendovascular consult given CTA neck findings  continue ASA/Plavix   Continue telemetry monitoring  Neuro attending note will follow

## 2019-07-31 NOTE — PROGRESS NOTE ADULT - ATTENDING COMMENTS
Patient seen and examined.  No new issues, MRI brain reviewed no acute changes.  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  Continue current meds and followup with neurology as out patient

## 2019-07-31 NOTE — DISCHARGE NOTE PROVIDER - HOSPITAL COURSE
59 year old lady known to have HTN  presenting with palpitations, chest pressure and worsening neck pain.    Patient says while she was watching TV she had sudden onset of rapid hert beatss, and chest tightness that she felt was spreading to her left neck. This wasalso associated with transient pain behind her eye and blurry vision. She denies vision loss or numbness or weakness or LOC. No other neurologic complaints. No pain with eye movements. No eye discharge. No fever or recent illness.    While in ED, code stroke was called because of another episode of  left periorbital pain and left visual blurring. NIHSS was  zero. No acute intervention done.        Patient was admitted to [facility] last week for same compliants. According to patient, she was diagnosed with carotid artery dissection but had no procedures. She was discharged on ASA, plavix and Atorvastatin.    Review of charts showed patient had an admission and ED presentation last year for palpitations and  chest tightness. Workup including ECG, cardiac enzymes, TSH, TTE, stress test were negative.        Neurology, ophthamology and neurovascular consulted and evaluated. Admitted on telemetry monitorin. MRI brain without VALERI and MRI neck and head with T1 fat suppression sequences done and

## 2019-07-31 NOTE — PROGRESS NOTE ADULT - SUBJECTIVE AND OBJECTIVE BOX
Neurology Follow up note  Patient seen and examined at bedside she denies any new complaints. No focal neuro deficits on exam      Vital Signs Last 24 Hrs  T(C): 36 (30 Jul 2019 20:21), Max: 36.6 (30 Jul 2019 08:03)  T(F): 96.8 (30 Jul 2019 20:21), Max: 97.8 (30 Jul 2019 08:03)  HR: 63 (30 Jul 2019 20:21) (63 - 84)  BP: 126/78 (30 Jul 2019 20:21) (100/68 - 138/78)  BP(mean): --  RR: 18 (30 Jul 2019 20:21) (18 - 19)  SpO2: 100% (30 Jul 2019 08:40) (100% - 100%)          Neurological Exam:   Mental status: Awake, alert and oriented x3.  Naming, repetition and comprehension intact.  Attention/concentration intact.  No dysarthria, no aphasia.  Fund of knowledge appropriate.    Cranial nerves:  pupils equally round and reactive to light, visual fields full, no nystagmus, extraocular muscles intact, V1 through V3 intact bilaterally and symmetric, face symmetric, hearing intact to finger rub, palate elevation symmetric, tongue was midline, sternocleidomastoid/shoulder shrug strength bilaterally 5/5.    Motor:  5/5 throughout/ no drift  Sensation: Intact   Coordination: No dysmetria or limb ataxia  Reflexes: 2+ in upper and lower extremities, downgoing toes bilaterally  Gait: deferred    mrankin 0  NIHSS 0      Medications  acetaminophen   Tablet .. 650 milliGRAM(s) Oral every 6 hours PRN  artificial  tears Solution 1 Drop(s) Left EYE four times a day  aspirin  chewable 81 milliGRAM(s) Oral daily  atorvastatin 80 milliGRAM(s) Oral at bedtime  clopidogrel Tablet 75 milliGRAM(s) Oral daily  enoxaparin Injectable 40 milliGRAM(s) SubCutaneous daily  losartan 100 milliGRAM(s) Oral daily  ofloxacin 0.3% Solution 1 Drop(s) Left EYE four times a day      Lab  Comprehensive Metabolic Panel in AM (07.30.19 @ 05:23)    Sodium, Serum: 144 mmol/L    Potassium, Serum: 3.9 mmol/L    Chloride, Serum: 108 mmol/L    Carbon Dioxide, Serum: 27 mmol/L    Anion Gap, Serum: 9 mmol/L    Blood Urea Nitrogen, Serum: 10 mg/dL    Creatinine, Serum: 0.7 mg/dL    Glucose, Serum: 89 mg/dL    Calcium, Total Serum: 9.6 mg/dL    Protein Total, Serum: 6.5 g/dL    Albumin, Serum: 3.9 g/dL    Bilirubin Total, Serum: 0.4 mg/dL    Alkaline Phosphatase, Serum: 91 U/L    Aspartate Aminotransferase (AST/SGOT): 16 U/L    Alanine Aminotransferase (ALT/SGPT): 15 U/L    eGFR if Non : 96: Interpretative comment    Complete Blood Count + Automated Diff in AM (07.30.19 @ 05:23)    WBC Count: 5.26 K/uL    RBC Count: 3.99 M/uL    Hemoglobin: 12.0 g/dL    Hematocrit: 36.2 %    Mean Cell Volume: 90.7 fL    Mean Cell Hemoglobin: 30.1 pg    Mean Cell Hemoglobin Conc: 33.1 g/dL    Red Cell Distrib Width: 12.3 %    Platelet Count - Automated: 206 K/uL    Auto Neutrophil #: 2.29 K/uL    Auto Lymphocyte #: 2.01 K/uL    Auto Monocyte #: 0.48 K/uL    Auto Eosinophil #: 0.44 K/uL    Auto Basophil #: 0.03 K/uL    Auto Neutrophil %: 43.5: Differential percentages must be correlated with absolute numbers for  clinical significance. %    Auto Lymphocyte %: 38.2 %    Auto Monocyte %: 9.1 %    Auto Eosinophil %: 8.4 %    Auto Basophil %: 0.6 %    Auto Immature Granulocyte %: 0.2 %    Nucleated RBC: 0 /100 WBCs    Sedimentation Rate, Erythrocyte (07.29.19 @ 04:30)    Sedimentation Rate, Erythrocyte: 22 mm/Hr          Radiology

## 2019-07-31 NOTE — DISCHARGE NOTE PROVIDER - CARE PROVIDER_API CALL
Zohaib Costa)  Neuromuscular Medicine  [address] [place] [address], [address]  [place], [place] [id number]  Phone: [phone]  Fax: [phone]  Follow Up Time:

## 2019-07-31 NOTE — MEDICAL STUDENT PROGRESS NOTE(EDUCATION) - SUBJECTIVE AND OBJECTIVE BOX
NILESH MOTLEY 59y Female  MRN#: [id number]   Hospital Day: 2d    SUBJECTIVE  Patient is a 59y old Female who presents with a chief complaint of neck pain (30 Jul 2019 10:34)  Currently admitted to medicine with the primary diagnosis of Acute visual loss, left    INTERVAL HPI AND OVERNIGHT EVENTS:  Patient was examined and seen at bedside. This morning she is resting comfortably in bed and reports no issues or overnight events.    REVIEW OF SYMPTOMS:  CONSTITUTIONAL: No weakness, fevers or chills; No headaches  EYES: No visual changes, eye pain, or discharge  ENT: No vertigo; No ear pain or change in hearing; No sore throat or difficulty swallowing  NECK: No pain or stiffness  RESPIRATORY: No cough, wheezing, or hemoptysis; No shortness of breath  CARDIOVASCULAR: No chest pain or palpitations  GASTROINTESTINAL: No abdominal or epigastric pain; No nausea, vomiting, or hematemesis; No diarrhea or constipation; No melena or hematochezia  GENITOURINARY: No dysuria, frequency or hematuria  MUSCULOSKELETAL: No joint pain, no muscle pain, no weakness  NEUROLOGICAL: No numbness or weakness  SKIN: No itching or rashes      OBJECTIVE  PAST MEDICAL & SURGICAL HISTORY  Palpitations  Hypertension  History of breast implant    ALLERGIES:  No Known Allergies    MEDICATIONS:  STANDING MEDICATIONS  artificial  tears Solution 1 Drop(s) Left EYE four times a day  aspirin  chewable 81 milliGRAM(s) Oral daily  atorvastatin 80 milliGRAM(s) Oral at bedtime  clopidogrel Tablet 75 milliGRAM(s) Oral daily  enoxaparin Injectable 40 milliGRAM(s) SubCutaneous daily  losartan 100 milliGRAM(s) Oral daily  ofloxacin 0.3% Solution 1 Drop(s) Left EYE four times a day    PRN MEDICATIONS  acetaminophen   Tablet .. 650 milliGRAM(s) Oral every 6 hours PRN      VITAL SIGNS: Last 24 Hours  T(C): 36.4 (31 Jul 2019 05:38), Max: 36.4 (30 Jul 2019 16:02)  T(F): 97.6 (31 Jul 2019 05:38), Max: 97.6 (31 Jul 2019 05:38)  HR: 77 (31 Jul 2019 05:38) (63 - 77)  BP: 117/72 (31 Jul 2019 05:38) (109/57 - 134/72)  BP(mean): --  RR: 18 (31 Jul 2019 05:38) (18 - 18)  SpO2: --    LABS:                        13.0   6.88  )-----------( 211      ( 31 Jul 2019 05:45 )             37.9     07-31    143  |  105  |  11  ----------------------------<  92  3.9   |  28  |  0.7    Ca    9.4      31 Jul 2019 05:45    TPro  6.5  /  Alb  4.0  /  TBili  0.4  /  DBili  x   /  AST  15  /  ALT  13  /  AlkPhos  90  07-31                  RADIOLOGY:    PHYSICAL EXAM:  CONSTITUTIONAL: No acute distress, well-developed, well-groomed, AAOx3  HEAD: Atraumatic, normocephalic  EYES: EOM intact, PERRLA, conjunctiva and sclera clear  ENT: Supple, no masses, no thyromegaly, no bruits, no JVD; moist mucous membranes  PULMONARY: Clear to auscultation bilaterally; no wheezes, rales, or rhonchi  CARDIOVASCULAR: Regular rate and rhythm; no murmurs, rubs, or gallops  GASTROINTESTINAL: Soft, non-tender, non-distended; bowel sounds present  MUSCULOSKELETAL: 2+ peripheral pulses; no clubbing, no cyanosis, no edema  NEUROLOGY: non-focal  SKIN: No rashes or lesions; warm and dry    ASSESSMENT & PLAN  #    PAST MEDICAL & SURGICAL HISTORY:  Palpitations  Hypertension  History of breast implant      #Misc  - DVT Prophylaxis:  - Diet:  - GI Prophylaxis:  - Activity:  - IV Fluids:  - Code Status:    Dispo: NILESH MOTLEY 59y Female  MRN#: [id number]   Hospital Day: 2d    SUBJECTIVE  Patient is a 59y old Female who presents with a chief complaint of palpitations, chest pressure and worsening neck pain.  Currently admitted to medicine with the primary diagnosis of Acute visual loss, left    INTERVAL HPI AND OVERNIGHT EVENTS:  Patient was examined and seen at bedside. This morning she is resting comfortably in bed and reports no issues or overnight events.    REVIEW OF SYMPTOMS:  CONSTITUTIONAL: No weakness, fevers or chills; No headaches  EYES: No visual changes, eye pain, or discharge  ENT: No vertigo; No ear pain or change in hearing; No sore throat or difficulty swallowing  NECK: No pain or stiffness  RESPIRATORY: No cough, wheezing, or hemoptysis; No shortness of breath  CARDIOVASCULAR: No chest pain or palpitations  GASTROINTESTINAL: No abdominal or epigastric pain; No nausea, vomiting, or hematemesis; No diarrhea or constipation; No melena or hematochezia  GENITOURINARY: No dysuria, frequency or hematuria  MUSCULOSKELETAL: No joint pain, no muscle pain, no weakness  NEUROLOGICAL: No numbness or weakness  SKIN: No itching or rashes      OBJECTIVE  PAST MEDICAL & SURGICAL HISTORY  Palpitations  Hypertension  History of breast implant    ALLERGIES:  No Known Allergies    MEDICATIONS:  STANDING MEDICATIONS  artificial  tears Solution 1 Drop(s) Left EYE four times a day  aspirin  chewable 81 milliGRAM(s) Oral daily  atorvastatin 80 milliGRAM(s) Oral at bedtime  clopidogrel Tablet 75 milliGRAM(s) Oral daily  enoxaparin Injectable 40 milliGRAM(s) SubCutaneous daily  losartan 100 milliGRAM(s) Oral daily  ofloxacin 0.3% Solution 1 Drop(s) Left EYE four times a day    PRN MEDICATIONS  acetaminophen   Tablet .. 650 milliGRAM(s) Oral every 6 hours PRN      VITAL SIGNS: Last 24 Hours  T(C): 36.4 (31 Jul 2019 05:38), Max: 36.4 (30 Jul 2019 16:02)  T(F): 97.6 (31 Jul 2019 05:38), Max: 97.6 (31 Jul 2019 05:38)  HR: 77 (31 Jul 2019 05:38) (63 - 77)  BP: 117/72 (31 Jul 2019 05:38) (109/57 - 134/72)  BP(mean): --  RR: 18 (31 Jul 2019 05:38) (18 - 18)  SpO2: --    LABS:                        13.0   6.88  )-----------( 211      ( 31 Jul 2019 05:45 )             37.9     07-31    143  |  105  |  11  ----------------------------<  92  3.9   |  28  |  0.7    Ca    9.4      31 Jul 2019 05:45    TPro  6.5  /  Alb  4.0  /  TBili  0.4  /  DBili  x   /  AST  15  /  ALT  13  /  AlkPhos  90  07-31                  RADIOLOGY:    PHYSICAL EXAM:  CONSTITUTIONAL: No acute distress, well-developed, well-groomed, AAOx3  HEAD: Atraumatic, normocephalic  EYES: EOM intact, PERRLA, conjunctiva and sclera clear  ENT: Supple, no masses, no thyromegaly, no bruits, no JVD; moist mucous membranes  PULMONARY: Clear to auscultation bilaterally; no wheezes, rales, or rhonchi  CARDIOVASCULAR: Regular rate and rhythm; no murmurs, rubs, or gallops  GASTROINTESTINAL: Soft, non-tender, non-distended; bowel sounds present  MUSCULOSKELETAL: 2+ peripheral pulses; no clubbing, no cyanosis, no edema  NEUROLOGY: non-focal  SKIN: No rashes or lesions; warm and dry    ASSESSMENT & PLAN  #    PAST MEDICAL & SURGICAL HISTORY:  Palpitations  Hypertension  History of breast implant      #Misc  - DVT Prophylaxis:  - Diet:  - GI Prophylaxis:  - Activity:  - IV Fluids:  - Code Status:    Dispo: NILESH MOTLEY 59y Female  MRN#: [id number]   Hospital Day: 2d    SUBJECTIVE  Patient is a 59y old Female, with a PMH of HTN and DLD, who presents with a chief complaint of palpitations, chest pressure and worsening neck pain.    Patient says that while she was watching TV she had sudden onset of rapid heart beats, and chest tightness that she felt was spreading to her left neck. The was also associated with transient pain behind her eye and blurry vision. She denies vision loss or numbness or weakness or LOC. No other neurologic complaints. No pain with eye movements. No eye discharge. No fever or recent illness.     While in the ED, code stroke was called because of another episode of L periorbital pain and left visual blurring. NIHSS was zero. No acute intervention done. Patient was discharged with valsartan, clopidogrel, and ASA.     Currently admitted to medicine with the primary diagnosis of Acute visual loss, left, chest pain, and palpitations.    INTERVAL HPI AND OVERNIGHT EVENTS:  Patient was examined and seen at bedside. This morning she is resting comfortably in bed and feels much better. She denies any eye pain or chest pain. She still complains of palpitations yesterday but not this morning.      REVIEW OF SYMPTOMS:  CONSTITUTIONAL: No weakness, fevers or chills; No headaches  EYES: No visual changes, eye pain, or discharge  ENT: No vertigo; No ear pain or change in hearing; No sore throat or difficulty swallowing  NECK: No pain or stiffness  RESPIRATORY: No cough, wheezing, or hemoptysis; No shortness of breath  CARDIOVASCULAR: positive for palpitations. No chest pain.   GASTROINTESTINAL: No abdominal or epigastric pain; No nausea, vomiting, or hematemesis; No diarrhea or constipation; No melena or hematochezia  GENITOURINARY: No dysuria, frequency or hematuria  MUSCULOSKELETAL: No joint pain, no muscle pain, no weakness  NEUROLOGICAL: No numbness or weakness  SKIN: No itching or rashes      OBJECTIVE  PAST MEDICAL & SURGICAL HISTORY  Palpitations  Hypertension  History of breast implant    ALLERGIES:  No Known Allergies    MEDICATIONS:  STANDING MEDICATIONS  artificial  tears Solution 1 Drop(s) Left EYE four times a day  aspirin  chewable 81 milliGRAM(s) Oral daily  atorvastatin 80 milliGRAM(s) Oral at bedtime  clopidogrel Tablet 75 milliGRAM(s) Oral daily  enoxaparin Injectable 40 milliGRAM(s) SubCutaneous daily  losartan 100 milliGRAM(s) Oral daily  ofloxacin 0.3% Solution 1 Drop(s) Left EYE four times a day    PRN MEDICATIONS  acetaminophen   Tablet .. 650 milliGRAM(s) Oral every 6 hours PRN      VITAL SIGNS: Last 24 Hours  T(C): 36.4 (31 Jul 2019 05:38), Max: 36.4 (30 Jul 2019 16:02)  T(F): 97.6 (31 Jul 2019 05:38), Max: 97.6 (31 Jul 2019 05:38)  HR: 77 (31 Jul 2019 05:38) (63 - 77)  BP: 117/72 (31 Jul 2019 05:38) (109/57 - 134/72)  BP(mean): --  RR: 18 (31 Jul 2019 05:38) (18 - 18)  SpO2: --    LABS:                        13.0   6.88  )-----------( 211      ( 31 Jul 2019 05:45 )             37.9     07-31    143  |  105  |  11  ----------------------------<  92  3.9   |  28  |  0.7    Ca    9.4      31 Jul 2019 05:45    TPro  6.5  /  Alb  4.0  /  TBili  0.4  /  DBili  x   /  AST  15  /  ALT  13  /  AlkPhos  90  07-31                  RADIOLOGY:  < from: CT Head No Cont (07.29.19 @ 04:33) >  IMPRESSION:    No CT evidence for acute intracranial hemorrhage or large territorial   infarct.     Dr. Bakari Blas discussed  findings with LARRY SAUCEDO on 7/29/2019   < from: CT Angio Neck w/ IV Cont (07.28.19 @ 16:45) >  IMPRESSION:     1. Small size of the left internal carotid artery throughout with a small   left carotid canal. This is likely on the basis of congenital narrowing.   Normal left sided intracranial flow likely from collaterals.    2. Beaded appearance of the left internal carotid artery within the   petrous segment and mild fusiform aneurysmal dilatation of the distal   cervical right internal carotid artery during of to 6.6 mm. These   findings can be seen in the setting of fibromuscular dysplasia in the   appropriate clinical setting.    3. Recommend correlation with prior outside imaging given history of   carotid dissection.    < end of copied text >  4:55 AM with readback.    < end of copied text >        PHYSICAL EXAM:  CONSTITUTIONAL: No acute distress, well-developed, well-groomed, AAOx3  HEAD: Atraumatic, normocephalic  EYES: EOM intact, PERRLA, conjunctiva and sclera clear  ENT: Supple, no masses, no thyromegaly, no bruits, no JVD; moist mucous membranes  PULMONARY: Clear to auscultation bilaterally; no wheezes, rales, or rhonchi  CARDIOVASCULAR: Regular rate and rhythm; no murmurs, rubs, or gallops  GASTROINTESTINAL: Soft, non-tender, non-distended; bowel sounds present  MUSCULOSKELETAL: 2+ peripheral pulses; no clubbing, no cyanosis, no edema  NEUROLOGY: non-focal  SKIN: No rashes or lesions; warm and dry    ASSESSMENT & PLAN  #    PAST MEDICAL & SURGICAL HISTORY:  Palpitations  Hypertension  History of breast implant      #Misc  - DVT Prophylaxis:  - Diet:  - GI Prophylaxis:  - Activity:  - IV Fluids:  - Code Status:    Dispo: NILESH MOTLEY 59y Female  MRN#: [id number]   Hospital Day: 2d    SUBJECTIVE  Patient is a 59y old Female, with a PMH of HTN and DLD, who presents with a chief complaint of palpitations, chest pressure and worsening neck pain.    Patient says that while she was watching TV she had sudden onset of rapid heart beats, and chest tightness that she felt was spreading to her left neck. The was also associated with transient pain behind her eye and blurry vision. She denies vision loss or numbness or weakness or LOC. No other neurologic complaints. No pain with eye movements. No eye discharge. No fever or recent illness.     While in the ED, code stroke was called because of another episode of L periorbital pain and left visual blurring. NIHSS was zero. No acute intervention done. Patient was discharged with valsartan, clopidogrel, and ASA.     Currently admitted to medicine with the primary diagnosis of Acute visual loss, left, chest pain, and palpitations.    INTERVAL HPI AND OVERNIGHT EVENTS:  Patient was examined and seen at bedside. This morning she is resting comfortably in bed and feels much better. She denies any eye pain or chest pain. She still complains of palpitations yesterday but not this morning.      REVIEW OF SYMPTOMS:  CONSTITUTIONAL: No weakness, fevers or chills; No headaches  EYES: No visual changes, eye pain, or discharge  ENT: No vertigo; No ear pain or change in hearing; No sore throat or difficulty swallowing  NECK: No pain or stiffness  RESPIRATORY: No cough, wheezing, or hemoptysis; No shortness of breath  CARDIOVASCULAR: positive for palpitations. No chest pain.   GASTROINTESTINAL: No abdominal or epigastric pain; No nausea, vomiting, or hematemesis; No diarrhea or constipation; No melena or hematochezia  GENITOURINARY: No dysuria, frequency or hematuria  MUSCULOSKELETAL: No joint pain, no muscle pain, no weakness  NEUROLOGICAL: No numbness or weakness  SKIN: No itching or rashes      OBJECTIVE  PAST MEDICAL & SURGICAL HISTORY  Palpitations  Hypertension  History of breast implant    ALLERGIES:  No Known Allergies    MEDICATIONS:  STANDING MEDICATIONS  artificial  tears Solution 1 Drop(s) Left EYE four times a day  aspirin  chewable 81 milliGRAM(s) Oral daily  atorvastatin 80 milliGRAM(s) Oral at bedtime  clopidogrel Tablet 75 milliGRAM(s) Oral daily  enoxaparin Injectable 40 milliGRAM(s) SubCutaneous daily  losartan 100 milliGRAM(s) Oral daily  ofloxacin 0.3% Solution 1 Drop(s) Left EYE four times a day    PRN MEDICATIONS  acetaminophen   Tablet .. 650 milliGRAM(s) Oral every 6 hours PRN      VITAL SIGNS: Last 24 Hours  T(C): 36.4 (31 Jul 2019 05:38), Max: 36.4 (30 Jul 2019 16:02)  T(F): 97.6 (31 Jul 2019 05:38), Max: 97.6 (31 Jul 2019 05:38)  HR: 77 (31 Jul 2019 05:38) (63 - 77)  BP: 117/72 (31 Jul 2019 05:38) (109/57 - 134/72)  BP(mean): --  RR: 18 (31 Jul 2019 05:38) (18 - 18)  SpO2: --    LABS:                        13.0   6.88  )-----------( 211      ( 31 Jul 2019 05:45 )             37.9     07-31    143  |  105  |  11  ----------------------------<  92  3.9   |  28  |  0.7    Ca    9.4      31 Jul 2019 05:45    TPro  6.5  /  Alb  4.0  /  TBili  0.4  /  DBili  x   /  AST  15  /  ALT  13  /  AlkPhos  90  07-31                  RADIOLOGY:  < from: CT Head No Cont (07.29.19 @ 04:33) >  IMPRESSION:    No CT evidence for acute intracranial hemorrhage or large territorial   infarct.     Dr. Bakari Blas discussed  findings with LARRY SAUCEDO on 7/29/2019   < from: CT Angio Neck w/ IV Cont (07.28.19 @ 16:45) >  IMPRESSION:     1. Small size of the left internal carotid artery throughout with a small   left carotid canal. This is likely on the basis of congenital narrowing.   Normal left sided intracranial flow likely from collaterals.    2. Beaded appearance of the left internal carotid artery within the   petrous segment and mild fusiform aneurysmal dilatation of the distal   cervical right internal carotid artery during of to 6.6 mm. These   findings can be seen in the setting of fibromuscular dysplasia in the   appropriate clinical setting.    3. Recommend correlation with prior outside imaging given history of   carotid dissection.    < end of copied text >  4:55 AM with readback.    < end of copied text >        PHYSICAL EXAM:  CONSTITUTIONAL: No acute distress, well-developed, well-groomed, AAOx3  HEAD: Atraumatic, normocephalic  EYES: EOM intact, PERRLA, conjunctiva and sclera clear  ENT: Supple, no masses, no thyromegaly, no bruits, no JVD; moist mucous membranes  PULMONARY: Clear to auscultation bilaterally; no wheezes, rales, or rhonchi  CARDIOVASCULAR: Regular rate and rhythm; no murmurs, rubs, or gallops  GASTROINTESTINAL: Soft, non-tender, non-distended; bowel sounds present  MUSCULOSKELETAL: 2+ peripheral pulses; no clubbing, no cyanosis, no edema  NEUROLOGY: non-focal  SKIN: No rashes or lesions; warm and dry

## 2019-08-01 ENCOUNTER — OUTPATIENT (OUTPATIENT)
Dept: OUTPATIENT SERVICES | Facility: HOSPITAL | Age: 59
LOS: 1 days | End: 2019-08-01
Payer: MEDICAID

## 2019-08-01 DIAGNOSIS — Z98.82 BREAST IMPLANT STATUS: Chronic | ICD-10-CM

## 2019-08-01 PROBLEM — R00.2 PALPITATIONS: Chronic | Status: ACTIVE | Noted: 2019-07-28

## 2019-08-01 PROCEDURE — G9001: CPT

## 2019-08-06 DIAGNOSIS — Z79.82 LONG TERM (CURRENT) USE OF ASPIRIN: ICD-10-CM

## 2019-08-06 DIAGNOSIS — I10 ESSENTIAL (PRIMARY) HYPERTENSION: ICD-10-CM

## 2019-08-06 DIAGNOSIS — H53.122 TRANSIENT VISUAL LOSS, LEFT EYE: ICD-10-CM

## 2019-08-06 DIAGNOSIS — I77.3 ARTERIAL FIBROMUSCULAR DYSPLASIA: ICD-10-CM

## 2019-08-06 DIAGNOSIS — H57.12 OCULAR PAIN, LEFT EYE: ICD-10-CM

## 2019-08-06 DIAGNOSIS — Z79.02 LONG TERM (CURRENT) USE OF ANTITHROMBOTICS/ANTIPLATELETS: ICD-10-CM

## 2019-08-07 ENCOUNTER — FORM ENCOUNTER (OUTPATIENT)
Age: 59
End: 2019-08-07

## 2019-08-13 DIAGNOSIS — Z71.89 OTHER SPECIFIED COUNSELING: ICD-10-CM

## 2019-09-18 ENCOUNTER — FORM ENCOUNTER (OUTPATIENT)
Age: 59
End: 2019-09-18

## 2019-09-18 ENCOUNTER — APPOINTMENT (OUTPATIENT)
Dept: NEUROLOGY | Facility: CLINIC | Age: 59
End: 2019-09-18
Payer: MEDICAID

## 2019-09-18 VITALS
HEIGHT: 63 IN | BODY MASS INDEX: 28.35 KG/M2 | WEIGHT: 160 LBS | DIASTOLIC BLOOD PRESSURE: 85 MMHG | HEART RATE: 81 BPM | SYSTOLIC BLOOD PRESSURE: 126 MMHG

## 2019-09-18 DIAGNOSIS — M62.838 OTHER MUSCLE SPASM: ICD-10-CM

## 2019-09-18 DIAGNOSIS — M54.2 CERVICALGIA: ICD-10-CM

## 2019-09-18 DIAGNOSIS — I77.3 ARTERIAL FIBROMUSCULAR DYSPLASIA: ICD-10-CM

## 2019-09-18 DIAGNOSIS — R20.0 ANESTHESIA OF SKIN: ICD-10-CM

## 2019-09-18 PROCEDURE — 99213 OFFICE O/P EST LOW 20 MIN: CPT

## 2019-09-18 NOTE — HISTORY OF PRESENT ILLNESS
[FreeTextEntry1] : Patient is a 59 year old woman with a history of hypertension who initially presented to [facility] with palpitations, blurry vision and numbness of upper extremities.  Patient had negative stroke w/u in hospital  and negative cardiac w/u in hospital.   Patient is Nauruan speaking.  JOSE Costello translated.     \par \par Patient c/o bilateral upper extremity numbness, neck pain, and left side facial twitching.  \par \par Patient is taking diovan, plavix, aspirin, atorvastatin.

## 2019-09-18 NOTE — ASSESSMENT
[FreeTextEntry1] : Patient is a 59 year old woman with a history of hypertension who initially presented to [facility] with palpitations, blurry vision and numbness of upper extremities.  Patient had negative stroke and cardiac work up in hospital except that vascular imaging was compatible with fibromuscular dysplasia.\par \par Patient c/o bilateral upper extremity numbness, neck pain, and left side facial twitching.  Strength 5/5, sensation intact.     Facial twitch could be mild hemifacial spasm.\par \par Plan:\par \par 1. Robaxin 500 mg po at night, \par 2. Continue atorvastatin, plavix and asa 81 mg.  \par 3. PT referral for cervicalgia.\par 4. EMG Bilateral upper extremities.\par 5. Return in 6 months.\par

## 2019-09-18 NOTE — PHYSICAL EXAM
[FreeTextEntry1] : Neurologic Examination:\par NAD. AOx3.  Intact memory.  Speech fluent, nondysarthric.  CN 2 – 12 normal.  LEFT SIDE FACIAL SPASM\par Strength 5/5 b/l UE/LE. NL tone, bulk. No abnl movements.  DTRs 2+ throughout.  Plantar response downgoing b/l.  (-) Hoffmans, clonus.  Sensory intact LT/PP, pain, temp, proprioception and vibration.  NL FTN/HKS.  No dysdiadokinesia.  Gait narrow based/NL tandem.  \par \par PATIENT C/O  BILATERAL UPPER EXTREMITY NUMBNESS, SENSATION INTACT STRENGTH 5/5\par \par +TTP over paraspinal muscles and upper trapezius bilaterally.

## 2019-10-25 ENCOUNTER — APPOINTMENT (OUTPATIENT)
Dept: NEUROLOGY | Facility: CLINIC | Age: 59
End: 2019-10-25

## 2019-11-11 ENCOUNTER — APPOINTMENT (OUTPATIENT)
Dept: CARDIOLOGY | Facility: CLINIC | Age: 59
End: 2019-11-11

## 2019-12-30 ENCOUNTER — FORM ENCOUNTER (OUTPATIENT)
Age: 59
End: 2019-12-30

## 2020-02-10 NOTE — ED ADULT TRIAGE NOTE - CHIEF COMPLAINT QUOTE
pt c/o chest pain since yesterday. 87 year old female comes in c/o fall off bed. lac noted to back of head. pt. in no apparent distress at this time, breathing even and unlabored.

## 2020-02-18 ENCOUNTER — FORM ENCOUNTER (OUTPATIENT)
Age: 60
End: 2020-02-18

## 2020-02-24 ENCOUNTER — APPOINTMENT (OUTPATIENT)
Dept: CARDIOLOGY | Facility: CLINIC | Age: 60
End: 2020-02-24

## 2020-03-11 ENCOUNTER — FORM ENCOUNTER (OUTPATIENT)
Age: 60
End: 2020-03-11

## 2020-04-29 NOTE — ED CDU PROVIDER DISPOSITION NOTE - CLINICAL COURSE
58yr old female placed in edou under low risk chest pain protocol. pt with workup including labs wnl, 2 sets of cardiac enzymes, cxray, normal stress test. pain improved, no events on cardiac monitor. outpt f/u. pt requested naproxen refill 40.8

## 2020-06-08 ENCOUNTER — APPOINTMENT (OUTPATIENT)
Dept: NEUROLOGY | Facility: CLINIC | Age: 60
End: 2020-06-08

## 2020-07-01 ENCOUNTER — FORM ENCOUNTER (OUTPATIENT)
Age: 60
End: 2020-07-01

## 2020-07-06 ENCOUNTER — FORM ENCOUNTER (OUTPATIENT)
Age: 60
End: 2020-07-06

## 2020-09-09 ENCOUNTER — FORM ENCOUNTER (OUTPATIENT)
Age: 60
End: 2020-09-09

## 2021-01-01 NOTE — DISCHARGE NOTE NURSING/CASE MANAGEMENT/SOCIAL WORK - NSDCDPATPORTLINK_GEN_ALL_CORE
You can access the [facility] Patient Portal, offered by [facility], by registering with the following website: [email or web address] Unable to Assess

## 2021-02-16 ENCOUNTER — FORM ENCOUNTER (OUTPATIENT)
Age: 61
End: 2021-02-16

## 2021-03-23 ENCOUNTER — FORM ENCOUNTER (OUTPATIENT)
Age: 61
End: 2021-03-23

## 2021-05-12 NOTE — CARDIOLOGY SUMMARY
"   Quick Note      Patient Name: Mallory Mata   Patient ID: [id number]   Sex: Female   YOB: 1976    Primary Care Provider: Tiffany Salomon MD    Visit Date: April 14, 2020    Provider: Tiffany Salomon MD   Location: [facility]   Location Address: [address] [place] [address], [address]  [place], [place]  [id number]   Location Phone: [phone]          History Of Present Illness  TELEHEALTH VISIT  Chief Complaint: \"just following up for medication\"   Mallory Mata is a 43 year old /Black female who is presenting for evaluation via telehealth visit. Verbal consent obtained before beginning visit.   Provider spent 11 minutes with the patient during telehealth visit.   The following staff were present during this visit: none   Past Medical History/Overview of Patient Symptoms     visit done by telephone    states that she is still seeing PMR and they are planning on cont physical therapy  she is continuing botox well    states that her stress level is down right now  Trintellix seems to be working well for her at this time    states that she has been taking a capful of vinegar to help with the swelling in her hands and feet  states that she doesn't notice any side effects of this medication    reviewed thyroid labs  she is planning to get those done at the end of april  still dealing with some fatigue and weight gain  otherwise doing well           Assessment  · Contracture of muscle of multiple sites     728.85/M62.49  doing well  cont to work with PMR  will cont baclofen and botox  · Recurrent major depressive disorder, in full remission     296.36/F33.42  doing well on trintellix  · Anxiety     300.02/F41.1  · Abnormal TSH     790.6/R79.89  await thyroid labs      Plan  · Orders  o Physician Telephone Evaluation, 11-20 minutes ([id number]) - - 04/14/2020  · Instructions  o Plan Of Care:             Electronically Signed by: Tiffany Salomon MD -Author on April 14, 2020 11:04:33 AM  " [___] : [unfilled] [No Ischemia] : no Ischemia

## 2021-05-16 ENCOUNTER — FORM ENCOUNTER (OUTPATIENT)
Age: 61
End: 2021-05-16

## 2021-08-03 ENCOUNTER — FORM ENCOUNTER (OUTPATIENT)
Age: 61
End: 2021-08-03

## 2021-10-18 ENCOUNTER — FORM ENCOUNTER (OUTPATIENT)
Age: 61
End: 2021-10-18

## 2021-10-24 ENCOUNTER — FORM ENCOUNTER (OUTPATIENT)
Age: 61
End: 2021-10-24

## 2022-02-10 ENCOUNTER — APPOINTMENT (OUTPATIENT)
Dept: BREAST CENTER | Facility: CLINIC | Age: 62
End: 2022-02-10
Payer: MEDICAID

## 2022-02-10 VITALS
TEMPERATURE: 97.1 F | SYSTOLIC BLOOD PRESSURE: 124 MMHG | DIASTOLIC BLOOD PRESSURE: 87 MMHG | HEIGHT: 63 IN | WEIGHT: 140 LBS | BODY MASS INDEX: 24.8 KG/M2

## 2022-02-10 DIAGNOSIS — Z86.79 PERSONAL HISTORY OF OTHER DISEASES OF THE CIRCULATORY SYSTEM: ICD-10-CM

## 2022-02-10 PROCEDURE — 99203 OFFICE O/P NEW LOW 30 MIN: CPT

## 2022-02-13 PROBLEM — Z86.79 HISTORY OF HYPERTENSION: Status: RESOLVED | Noted: 2018-12-31 | Resolved: 2022-02-13

## 2022-02-13 NOTE — REVIEW OF SYSTEMS
[Fever] : no fever [Chills] : no chills [Abn Vaginal Bleeding] : no unexplained vaginal bleeding [As Noted in HPI] : as noted in HPI [Skin Lesions] : no skin lesions [Skin Wound] : no skin wound [Breast Pain] : breast pain [Breast Lump] : no breast lump [Negative] : Heme/Lymph

## 2022-02-13 NOTE — PHYSICAL EXAM
[Normocephalic] : normocephalic [Atraumatic] : atraumatic [EOMI] : extra ocular movement intact [No Supraclavicular Adenopathy] : no supraclavicular adenopathy [No Cervical Adenopathy] : no cervical adenopathy [No dominant masses] : no dominant masses in right breast  [No dominant masses] : no dominant masses left breast [No Nipple Retraction] : no left nipple retraction [No Nipple Discharge] : no left nipple discharge [No Axillary Lymphadenopathy] : no left axillary lymphadenopathy [Soft] : abdomen soft [Not Tender] : non-tender [No Edema] : no edema [No Rashes] : no rashes [No Ulceration] : no ulceration [de-identified] : bilateral implants in place, surgical incisions are well healed, no significant capsular contracture, no suspicious abnormalities palpated within either breast

## 2022-02-13 NOTE — ASSESSMENT
[FreeTextEntry1] : Zaria is a Macedonian speaking 61 year old female here with DX of RIGHT breast complex papillary sclerosing lesion. \par \par On exam, she has bilateral implants in place, surgical incisions are well healed, no significant capsular contracture, no suspicious abnormalities palpated within either breast. \par \par Her most recent imaging was a b/l screening mammogram and subsequent R dx mammogram and US on 11/23/2021 and 12/6/2021 which revealed an indeterminate focal asymmetry in the right breast @9:00, without an US correlate, which was biopsy proven complex sclerosing papillary lesion.  Of note, her biopsy clip has migrated 2 cm inferiorly from target lesion. \par \par We discussed radial scars without atypia.  These lesions are considered fibroproliferative lesions without atypia.  Patients with these lesions were found to have a slightly increased relative risk compared to the reference population.  However the lesions themselves do not have any malignant potential. There is about a 10-20% upgrade rate to a high risk lesion (including atypical hyperplasias) and a <3% upgrade to cancer.  However, when atypia is present there is up to a 15-25% upgrade rate to cancer.  \par \par Intraductal papillomas without atypia are considered fibroproliferative lesions without atypia.  Patients with these lesions were found to have a slightly increased relative risk of breast cancer compared to the reference population.  However the lesions themselves do not have any malignant potential. \par \par Although newer studies regarding the upgrade rate (to cancer) ranges from 0-14% on surgical excision, with pathologic/radiologic concordance, older studies found a higher upgrade rate. \par \par Because her diagnosis is not clear, I have recommended surgical excision of her R breast lesion.  This is not readily palpable and will need to be localized preoperatively with an RF tag. \par \par The risks and benefits of the procedure were explained to the patient, including bleeding, infection, seroma/hematoma formation, and possible re-operation if the surgical excision yields an upgrade to cancer with positive margins. Informed consent was obtained today.\par \par She is otherwise at an average risk for breast cancer and should continue with annual screening mammogram. \par \par All of her questions were answered.  She knows to call with any further questions or concerns. \par \par PLAN:\par -OR: R WLE WITH RF LOCALIZATION \par -DIAGNOSIS: RIGHT BREAST RADIAL SCAR AND INTRADUCTAL PAPILLOMA \par -f/up after

## 2022-02-13 NOTE — REASON FOR VISIT
[Initial Evaluation] : an initial evaluation [Source: ______] : History obtained from [unfilled] [FreeTextEntry1] : R complex papillary sclerosing lesion

## 2022-02-13 NOTE — HISTORY OF PRESENT ILLNESS
[FreeTextEntry1] : Zaria is a Italian speaking 61 year old female here with DX of RIGHT breast complex papillary sclerosing lesion. \par \par She has a history of breast augmentation with implants 6 years prior in [facility].  She had her implants replaced three years prior, also in [facility], and is now complaining of breast pain and is interested in removing or adjusting her implants.  \par \par Otherwise she has not palpated any abnormal masses within either breast and denies any nipple discharge or retraction. \par \par Her imaging is as follows:\par 21 B/L dx mammo and US\par -scattered areas of fibroglandular density\par -bilateral subpectoralis silicone implants\par - right breast at 9:00 location there is a focal asymmetry-->additional imaging is recommended.\par Mammo BI-RADS 0\par \par 21 RIGHT dx mammo and US\par -Indeterminate focal asymmetry in the right breast at 9:00 location without sonographic correlate-->Stereotactic   core biopsy\par BIRADS4\par \par 21 RIGHT stereotactic bx (buckle)\par - *Complex papillary sclerosing lesion* \par -PORTIONS OF COMPLEX  SCLEROSING PAPILLARY LESION  (SIZE AT LEAST 0.3 CM) INVOLVED BY FLORID  USUAL DUCT HYPERPLASIA (UDH) IN THIS SAMPLING  SLIDE A1-10\par - FIBROADENOMATOID CHANGES \par - PSEUDOANGIOMATOUS STROMAL HYPERPLASIA (PASH)\par -***biopsy clip has migrated 2 cm inferiorly \par \par HISTORICAL RISK FACTORS:\par -bilateral subpectoralis silicone implants\par -no fam hx of breast or ovarian cancer\par -, first born at age 16\par -no OCP use\par -no gyn surgery

## 2022-02-13 NOTE — PAST MEDICAL HISTORY
[Menarche Age ____] : age at menarche was [unfilled] [Total Preg ___] : G[unfilled] [Live Births ___] : P[unfilled]  [Age At Live Birth ___] : Age at live birth: [unfilled] [Menopause Age____] : age at menopause was [unfilled] [History of Hormone Replacement Treatment] : has no history of hormone replacement treatment [FreeTextEntry5] : denies  [FreeTextEntry6] : denies [FreeTextEntry7] : denies [FreeTextEntry8] : denies

## 2022-02-13 NOTE — DATA REVIEWED
[FreeTextEntry1] :  \par Nov 23, 2021 \par MAMMO TOMOSYNTHESIS SCREENING WITH IMPLANTS BILATERAL, US BREAST COMPLETE BILATERAL\par Clinical Breast Exam: Patient does report clinical breast exam in the last year.\par Clinical Indication: Routine screening. No family history of breast cancer.\par Compared to: 03/12/2019\par MAMMOGRAM: \par Tomosynthesis and 2D imaging of the breast(s) were performed.  Current study was also evaluated with a computer aided detection (CAD) system.\par \par Breast density: There are scattered areas of fibroglandular density.\par No significant masses, calcifications, or other findings are seen in the left breast. There are bilateral subpectoralis silicone implants. In the right breast at 9:00 location there is a focal asymmetry for which additional imaging is recommended.\par Mammo BI-RADS 0: INCOMPLETE - NEED ADDITIONAL IMAGING EVALUATION\par \par  \par US BREAST COMPLETE BILATERAL\par Ultrasound evaluation was performed including examination of all four quadrants of the breast(s) and the retroareolar region(s).\par No suspicious abnormalities were seen sonographically in either breast.\par Ultrasound BI-RADS 1: NEGATIVE\par OVERALL IMPRESSION: OVERALL BI-RADS 0: INCOMPLETE\par \par    \par Dec 6, 2021  \par MAMMO TOMOSYNTHESIS CALLBACK FROM SCREENING RIGHT, US BREAST LIMITED RIGHT\par Clinical Beast Exam: Patient does report clinical breast exam in the last year.\par Clinical Indication: No family history of breast cancer. Abnormal Findings on Mammogram.\par \par Compared to: 11/23/2021 and 03/12/2019\par \par MAMMOGRAM: \par \par Tomosynthesis and 2D imaging of the breast(s) were performed.  Current study was also evaluated with a computer aided detection (CAD) system.\par \par Breast Density: There are scattered areas of fibroglandular density.\par In the right breast at 9:00 location there is a focal asymmetry which persists on additional imaging.\par \par US BREAST LIMITED RIGHT\par \par  \par \par Color flow, Gray scale and real-time ultrasound of the right breast was performed and targeted to the area(s) of interest.\par \par \par No suspicious abnormalities were seen sonographically in the right breast.\par OVERALL IMPRESSION: \par Indeterminate focal asymmetry in the right breast at 9:00 location without sonographic correlate. Stereotactic core biopsy of an upright tomosynthesis stable is recommended.\par Biopsy of the right breast(s) is recommended. A letter will be sent to the patient to return for a biopsy.\par The findings and recommendations were discussed with the patient.\par BI-RADS 4: SUSPICIOUS\par \par   \par  \par Dec 21, 2021  \par The pathology report of the right breast stereotactic biopsy dated 12/21/2021 indicated that the target at 9:00 is HIGH RISK \par \par A. Breast, right, 9 o'clock, stereotactic biopsy: \par \par - *Complex papillary sclerosing lesion* \par \par -PORTIONS OF COMPLEX  SCLEROSING PAPILLARY LESION  (SIZE AT LEAST 0.3 CM) INVOLVED BY FLORID  USUAL DUCT HYPERPLASIA (UDH) IN THIS SAMPLING \par \par SLIDE A1-10\par \par - FIBROADENOMATOID CHANGES \par \par - PSEUDOANGIOMATOUS STROMAL HYPERPLASIA (PASH)\par \par  \par \par  \par \par  \par \par The pathology results are concordant with the imaging findings. Please note that the biopsy marker clip has migrated approximately 2 cm inferior to the previously questioned asymmetry.\par \par  \par \par The patient has been notified of these results by telephone. She has been advised to contact your office and to arrange for SURGICAL CONSULTATION to discuss management options.\par \par  \par \par Surgical consultation of the right breast(s) is recommended. _\par \par  \par \par Electronic Signature: I personally reviewed the images and agree with this report. Final Report: Dictated by  and Signed by Attending Shannan Conner MD 12/28/2021 2:52 PM \par \par \par Addended by Shannan Conner MD on 12/28/2021  2:52 PM \par \par \par \par \par Study Result\par \par \par \par Addenda \par \par \par \par \par Pathology Report Received From [facility]:\par \par  \par \par The pathology report of the right breast stereotactic biopsy dated 12/21/2021 indicated that the target at 9:00 is HIGH RISK \par \par  \par \par  \par \par Final Diagnosis \par \par A. Breast, right, 9 o'clock, stereotactic biopsy: \par \par - *Complex papillary sclerosing lesion* \par \par -PORTIONS OF COMPLEX  SCLEROSING PAPILLARY LESION  (SIZE AT LEAST 0.3 CM) INVOLVED BY FLORID  USUAL DUCT HYPERPLASIA (UDH) IN THIS SAMPLING \par \par SLIDE A1-10\par \par - FIBROADENOMATOID CHANGES \par \par - PSEUDOANGIOMATOUS STROMAL HYPERPLASIA (PASH)\par \par The pathology results are concordant with the imaging findings. Please note that the biopsy marker clip has migrated approximately 2 cm inferior to the previously questioned asymmetry.\par \par  \par \par The patient has been notified of these results by telephone. She has been advised to contact your office and to arrange for SURGICAL CONSULTATION to discuss management options.\par \par Surgical consultation of the right breast(s) is recommended. _\par \par MAMMO STEREO CORE BREAST BIOPSY RIGHT\par \par History and consultation: Review of the mammogram dated 12/6/2021 revealed an asymmetry in the right breast at 9:00 o'clock, which was recommended for biopsy. \par Benefits, risks, and alternatives to stereotactically guided biopsy of the breast were discussed with the patient and informed, written consent was obtained. \par The patient denied taking aspirin or anticoagulants for the past week and stated no allergies to topical antiseptic solution or local anesthetic. \par \par Utilizing universal protocol, site and patient verification was performed prior to starting the procedure. \par \par Procedure: \par The skin was prepped in the usual manner.  Local anesthetic was administered to the access site. A skin nick was made in the breast. The abnormality was approached from a craniocaudal approach using an upright digital mammography unit. A 9 gauge vacuum assisted biopsy device was utilized to obtain multiple specimens. \par Subsequently, using standard technique, a buckle shaped metallic clip was placed at the biopsy site. \par POST PROCEDURE MAMMOGRAM FOR MARKER PLACEMENT\par A post-procedure mammogram was performed and demonstrates a clip approximately 2 cm inferior to the previously questioned asymmetry. The patient tolerated the procedure well without immediate complication. \par IMPRESSION\par t stereotactic core biopsy of the right breast with placement of a metallic clip. Pathology pending. \par A final report will be issued when Pathology results are available. _\par \par   \par \par

## 2022-02-16 ENCOUNTER — FORM ENCOUNTER (OUTPATIENT)
Age: 62
End: 2022-02-16

## 2022-02-17 ENCOUNTER — FORM ENCOUNTER (OUTPATIENT)
Age: 62
End: 2022-02-17

## 2022-02-23 ENCOUNTER — OUTPATIENT (OUTPATIENT)
Dept: OUTPATIENT SERVICES | Facility: HOSPITAL | Age: 62
LOS: 1 days | Discharge: HOME | End: 2022-02-23

## 2022-02-23 DIAGNOSIS — Z98.82 BREAST IMPLANT STATUS: Chronic | ICD-10-CM

## 2022-02-23 DIAGNOSIS — Z02.9 ENCOUNTER FOR ADMINISTRATIVE EXAMINATIONS, UNSPECIFIED: ICD-10-CM

## 2022-03-03 ENCOUNTER — NON-APPOINTMENT (OUTPATIENT)
Age: 62
End: 2022-03-03

## 2022-03-07 ENCOUNTER — LABORATORY RESULT (OUTPATIENT)
Age: 62
End: 2022-03-07

## 2022-03-23 ENCOUNTER — OUTPATIENT (OUTPATIENT)
Dept: OUTPATIENT SERVICES | Facility: HOSPITAL | Age: 62
LOS: 1 days | Discharge: HOME | End: 2022-03-23
Payer: MEDICAID

## 2022-03-23 VITALS
TEMPERATURE: 97 F | HEART RATE: 59 BPM | RESPIRATION RATE: 20 BRPM | OXYGEN SATURATION: 99 % | SYSTOLIC BLOOD PRESSURE: 133 MMHG | HEIGHT: 63 IN | WEIGHT: 149.91 LBS | DIASTOLIC BLOOD PRESSURE: 78 MMHG

## 2022-03-23 DIAGNOSIS — Z01.818 ENCOUNTER FOR OTHER PREPROCEDURAL EXAMINATION: ICD-10-CM

## 2022-03-23 DIAGNOSIS — Z98.82 BREAST IMPLANT STATUS: Chronic | ICD-10-CM

## 2022-03-23 DIAGNOSIS — D24.1 BENIGN NEOPLASM OF RIGHT BREAST: ICD-10-CM

## 2022-03-23 LAB
ALBUMIN SERPL ELPH-MCNC: 4.3 G/DL — SIGNIFICANT CHANGE UP (ref 3.5–5.2)
ALP SERPL-CCNC: 83 U/L — SIGNIFICANT CHANGE UP (ref 30–115)
ALT FLD-CCNC: 15 U/L — SIGNIFICANT CHANGE UP (ref 0–41)
ANION GAP SERPL CALC-SCNC: 12 MMOL/L — SIGNIFICANT CHANGE UP (ref 7–14)
APTT BLD: 44.5 SEC — HIGH (ref 27–39.2)
AST SERPL-CCNC: 18 U/L — SIGNIFICANT CHANGE UP (ref 0–41)
BASOPHILS # BLD AUTO: 0.02 K/UL — SIGNIFICANT CHANGE UP (ref 0–0.2)
BASOPHILS NFR BLD AUTO: 0.3 % — SIGNIFICANT CHANGE UP (ref 0–1)
BILIRUB SERPL-MCNC: <0.2 MG/DL — SIGNIFICANT CHANGE UP (ref 0.2–1.2)
BUN SERPL-MCNC: 17 MG/DL — SIGNIFICANT CHANGE UP (ref 10–20)
CALCIUM SERPL-MCNC: 9.8 MG/DL — SIGNIFICANT CHANGE UP (ref 8.5–10.1)
CHLORIDE SERPL-SCNC: 103 MMOL/L — SIGNIFICANT CHANGE UP (ref 98–110)
CO2 SERPL-SCNC: 26 MMOL/L — SIGNIFICANT CHANGE UP (ref 17–32)
CREAT SERPL-MCNC: 0.7 MG/DL — SIGNIFICANT CHANGE UP (ref 0.7–1.5)
EGFR: 99 ML/MIN/1.73M2 — SIGNIFICANT CHANGE UP
EOSINOPHIL # BLD AUTO: 0.15 K/UL — SIGNIFICANT CHANGE UP (ref 0–0.7)
EOSINOPHIL NFR BLD AUTO: 2.2 % — SIGNIFICANT CHANGE UP (ref 0–8)
GLUCOSE SERPL-MCNC: 68 MG/DL — LOW (ref 70–99)
HCT VFR BLD CALC: 38.9 % — SIGNIFICANT CHANGE UP (ref 37–47)
HGB BLD-MCNC: 12.9 G/DL — SIGNIFICANT CHANGE UP (ref 12–16)
IMM GRANULOCYTES NFR BLD AUTO: 0.1 % — SIGNIFICANT CHANGE UP (ref 0.1–0.3)
INR BLD: 1.03 RATIO — SIGNIFICANT CHANGE UP (ref 0.65–1.3)
LYMPHOCYTES # BLD AUTO: 3.03 K/UL — SIGNIFICANT CHANGE UP (ref 1.2–3.4)
LYMPHOCYTES # BLD AUTO: 43.6 % — SIGNIFICANT CHANGE UP (ref 20.5–51.1)
MCHC RBC-ENTMCNC: 30.7 PG — SIGNIFICANT CHANGE UP (ref 27–31)
MCHC RBC-ENTMCNC: 33.2 G/DL — SIGNIFICANT CHANGE UP (ref 32–37)
MCV RBC AUTO: 92.6 FL — SIGNIFICANT CHANGE UP (ref 81–99)
MONOCYTES # BLD AUTO: 0.57 K/UL — SIGNIFICANT CHANGE UP (ref 0.1–0.6)
MONOCYTES NFR BLD AUTO: 8.2 % — SIGNIFICANT CHANGE UP (ref 1.7–9.3)
NEUTROPHILS # BLD AUTO: 3.17 K/UL — SIGNIFICANT CHANGE UP (ref 1.4–6.5)
NEUTROPHILS NFR BLD AUTO: 45.6 % — SIGNIFICANT CHANGE UP (ref 42.2–75.2)
NRBC # BLD: 0 /100 WBCS — SIGNIFICANT CHANGE UP (ref 0–0)
PLATELET # BLD AUTO: 229 K/UL — SIGNIFICANT CHANGE UP (ref 130–400)
POTASSIUM SERPL-MCNC: 4.1 MMOL/L — SIGNIFICANT CHANGE UP (ref 3.5–5)
POTASSIUM SERPL-SCNC: 4.1 MMOL/L — SIGNIFICANT CHANGE UP (ref 3.5–5)
PROT SERPL-MCNC: 6.9 G/DL — SIGNIFICANT CHANGE UP (ref 6–8)
PROTHROM AB SERPL-ACNC: 11.8 SEC — SIGNIFICANT CHANGE UP (ref 9.95–12.87)
RBC # BLD: 4.2 M/UL — SIGNIFICANT CHANGE UP (ref 4.2–5.4)
RBC # FLD: 12.8 % — SIGNIFICANT CHANGE UP (ref 11.5–14.5)
SODIUM SERPL-SCNC: 141 MMOL/L — SIGNIFICANT CHANGE UP (ref 135–146)
WBC # BLD: 6.95 K/UL — SIGNIFICANT CHANGE UP (ref 4.8–10.8)
WBC # FLD AUTO: 6.95 K/UL — SIGNIFICANT CHANGE UP (ref 4.8–10.8)

## 2022-03-23 PROCEDURE — 93010 ELECTROCARDIOGRAM REPORT: CPT

## 2022-03-23 NOTE — H&P PST ADULT - NSICDXPASTMEDICALHX_GEN_ALL_CORE_FT
PAST MEDICAL HISTORY:  Hypertension     Palpitations     Stroke 2019 does not see neuro, cardio gives her asa & plavix

## 2022-03-23 NOTE — H&P PST ADULT - NSANTHOSAYNRD_GEN_A_CORE
No. CARLOZ screening performed.  STOP BANG Legend: 0-2 = LOW Risk; 3-4 = INTERMEDIATE Risk; 5-8 = HIGH Risk

## 2022-03-23 NOTE — H&P PST ADULT - HISTORY OF PRESENT ILLNESS
62 yo f here for past. pt sched for rt breast wide local exc w/ radiofrequency on 4/1/2022 in [place] under [facility] with dr. champagne  pt states she has a mass on her right breast that is not cancer but they still want to remove it    Pt reports no cardiopulmonary issues denies sob/turcios/cp/palpitations. Pt states no recent infections no fever no cough no uti uri. Stated exercise tolerance is  2   flights no changes. Juliocesar screen revd.  pt verbalized and understanding of instructions  given and all concerns and questions asked and answered.  Pt denies any s/s covid 19 and reports no contact with known positive people. Pt has appointment for repeat covid testing pre op and instructed to continue to self monitor and report any concerns to MD. Pt will continue to practice self isolation and  exposure control measures pre op  Anesthesia Alert  NO--Difficult Airway  NO--History of neck surgery or radiation  NO--Limited ROM of neck  NO--History of Malignant hyperthermia  NO--No personal or family history of Pseudocholinesterase deficiency.  NO--Prior Anesthesia Complication  NO--Latex Allergy  NO--Loose teeth  NO--History of Rheumatoid Arthritis  NO--JULIOCESAR  NO--Other_____  written and verbal instructions with teach back on chlorhexidine shampoo provided,  pt verbalized understanding with returned demonstration  PT DENIES ANY RASHES, ABRASION, OR OPEN WOUNDS OR CUTS  denies travel outside the [place] in the past 30 days

## 2022-03-28 ENCOUNTER — NON-APPOINTMENT (OUTPATIENT)
Age: 62
End: 2022-03-28

## 2022-03-29 ENCOUNTER — NON-APPOINTMENT (OUTPATIENT)
Age: 62
End: 2022-03-29

## 2022-03-30 ENCOUNTER — OUTPATIENT (OUTPATIENT)
Dept: OUTPATIENT SERVICES | Facility: HOSPITAL | Age: 62
LOS: 1 days | Discharge: HOME | End: 2022-03-30
Payer: MEDICAID

## 2022-03-30 ENCOUNTER — RESULT REVIEW (OUTPATIENT)
Age: 62
End: 2022-03-30

## 2022-03-30 ENCOUNTER — LABORATORY RESULT (OUTPATIENT)
Age: 62
End: 2022-03-30

## 2022-03-30 ENCOUNTER — NON-APPOINTMENT (OUTPATIENT)
Age: 62
End: 2022-03-30

## 2022-03-30 DIAGNOSIS — Z98.82 BREAST IMPLANT STATUS: Chronic | ICD-10-CM

## 2022-03-30 PROBLEM — I63.9 CEREBRAL INFARCTION, UNSPECIFIED: Chronic | Status: ACTIVE | Noted: 2022-03-23

## 2022-03-30 PROCEDURE — 19281 PERQ DEVICE BREAST 1ST IMAG: CPT | Mod: RT

## 2022-03-31 NOTE — ASU PATIENT PROFILE, ADULT - NPO AFTER
04:00

## 2022-04-01 ENCOUNTER — APPOINTMENT (OUTPATIENT)
Dept: BREAST CENTER | Facility: AMBULATORY SURGERY CENTER | Age: 62
End: 2022-04-01

## 2022-04-01 ENCOUNTER — RESULT REVIEW (OUTPATIENT)
Age: 62
End: 2022-04-01

## 2022-04-01 ENCOUNTER — TRANSCRIPTION ENCOUNTER (OUTPATIENT)
Age: 62
End: 2022-04-01

## 2022-04-01 ENCOUNTER — OUTPATIENT (OUTPATIENT)
Dept: OUTPATIENT SERVICES | Facility: HOSPITAL | Age: 62
LOS: 1 days | Discharge: HOME | End: 2022-04-01
Payer: MEDICAID

## 2022-04-01 VITALS
OXYGEN SATURATION: 100 % | SYSTOLIC BLOOD PRESSURE: 139 MMHG | RESPIRATION RATE: 20 BRPM | HEART RATE: 64 BPM | TEMPERATURE: 98 F | HEIGHT: 63 IN | DIASTOLIC BLOOD PRESSURE: 72 MMHG | WEIGHT: 149.91 LBS

## 2022-04-01 VITALS
RESPIRATION RATE: 16 BRPM | OXYGEN SATURATION: 100 % | HEART RATE: 67 BPM | DIASTOLIC BLOOD PRESSURE: 78 MMHG | SYSTOLIC BLOOD PRESSURE: 139 MMHG

## 2022-04-01 DIAGNOSIS — Z98.82 BREAST IMPLANT STATUS: Chronic | ICD-10-CM

## 2022-04-01 PROCEDURE — 19301 PARTIAL MASTECTOMY: CPT | Mod: RT

## 2022-04-01 PROCEDURE — 88305 TISSUE EXAM BY PATHOLOGIST: CPT | Mod: 26

## 2022-04-01 RX ORDER — ACETAMINOPHEN 500 MG
2 TABLET ORAL
Qty: 56 | Refills: 0
Start: 2022-04-01 | End: 2022-04-07

## 2022-04-01 RX ORDER — CLOPIDOGREL BISULFATE 75 MG/1
1 TABLET, FILM COATED ORAL
Qty: 0 | Refills: 0 | DISCHARGE

## 2022-04-01 RX ORDER — IBUPROFEN 200 MG
2 TABLET ORAL
Qty: 42 | Refills: 0
Start: 2022-04-01 | End: 2022-04-07

## 2022-04-01 RX ORDER — VALSARTAN 80 MG/1
1 TABLET ORAL
Qty: 0 | Refills: 0 | DISCHARGE

## 2022-04-01 RX ORDER — HYDROMORPHONE HYDROCHLORIDE 2 MG/ML
0.5 INJECTION INTRAMUSCULAR; INTRAVENOUS; SUBCUTANEOUS
Refills: 0 | Status: DISCONTINUED | OUTPATIENT
Start: 2022-04-01 | End: 2022-04-01

## 2022-04-01 RX ORDER — ATORVASTATIN CALCIUM 80 MG/1
1 TABLET, FILM COATED ORAL
Qty: 0 | Refills: 0 | DISCHARGE

## 2022-04-01 RX ORDER — METOPROLOL TARTRATE 50 MG
1 TABLET ORAL
Qty: 0 | Refills: 0 | DISCHARGE

## 2022-04-01 RX ORDER — ONDANSETRON 8 MG/1
4 TABLET, FILM COATED ORAL ONCE
Refills: 0 | Status: DISCONTINUED | OUTPATIENT
Start: 2022-04-01 | End: 2022-04-15

## 2022-04-01 NOTE — ASU DISCHARGE PLAN (ADULT/PEDIATRIC) - CARE PROVIDER_API CALL
Tamara Chamberlain)  Surgery  [facility] [address]nd [place] [address]  Phone: [phone]  Fax: [phone]  Scheduled Appointment: 04/12/2022 03:45 PM

## 2022-04-01 NOTE — ASU DISCHARGE PLAN (ADULT/PEDIATRIC) - ASU DC SPECIAL INSTRUCTIONSFT
You are being discharged home. Wear sport bra for extra support. Take Tylenol and Motrin around the clock for 3 days as directed, afterwords take as needed for pain. Restart Plavix and Aspirin tomorrow 04/02/22, if no bleeding or hematoma observed over incision site. Call dr. Chamberlain's office and confirm your scheduled follow up appointment.

## 2022-04-01 NOTE — BRIEF OPERATIVE NOTE - NSICDXBRIEFPOSTOP_GEN_ALL_CORE_FT
POST-OP DIAGNOSIS:  Intraductal papilloma of right breast 01-Apr-2022 08:36:09  Tamara Chamberlain  Radial scar of right breast 01-Apr-2022 08:36:11  Tamara Chamberlain

## 2022-04-01 NOTE — BRIEF OPERATIVE NOTE - NSICDXBRIEFPREOP_GEN_ALL_CORE_FT
PRE-OP DIAGNOSIS:  Intraductal papilloma of right breast 01-Apr-2022 08:35:58  Tamara Chamberlain  Radial scar of right breast 01-Apr-2022 08:36:06  Tamara Chamberlain

## 2022-04-01 NOTE — PRE-ANESTHESIA EVALUATION ADULT - NSANTHPMHFT_GEN_ALL_CORE
cva 2018 without residual deficits, had headache at the time  follows with doctor, has remained asymptomatic on antihypertensives  takes asa/plavix for secondary prevention    METS>4 without CP/palpitations/sob  Denies orthopnea

## 2022-04-01 NOTE — ASU DISCHARGE PLAN (ADULT/PEDIATRIC) - NS MD DC FALL RISK RISK
For information on Fall & Injury Prevention, visit: [email or web address] OR  [email or web address] OR  https://www.cdc.gov/steadi/patient.html

## 2022-04-01 NOTE — PRE-ANESTHESIA EVALUATION ADULT - NSANTHADDINFOFT_GEN_ALL_CORE
MAC planned, backup GA  Discussed risks, including dental injury and more serious complications including cardiac and pulmonary complications and stroke.  Patient expresses understanding with regard to risks of anesthesia and wishes to proceed.     used for discussion as documented on consent

## 2022-04-01 NOTE — CHART NOTE - NSCHARTNOTEFT_GEN_A_CORE
PACU ANESTHESIA ADMISSION NOTE      Procedure: Lumpectomy of right breast mass      Post op diagnosis:  Intraductal papilloma of right breast    Radial scar of right breast        ____  Intubated  TV:______       Rate: ______      FiO2: ______    ___x_  Patent Airway    __x__  Full return of protective reflexes    __x__  Full recovery from anesthesia / back to baseline     Vitals:   T:   98        R:  15                BP:  124/73                Sat:   99                P: 87      Mental Status:  __x__ Awake   _____ Alert   _____ Drowsy   _____ Sedated    Nausea/Vomiting:  __x__ NO  ______Yes,   See Post - Op Orders          Pain Scale (0-10):  _____    Treatment: ____ None    __x__ See Post - Op/PCA Orders    Post - Operative Fluids:   ____ Oral   ___x_ See Post - Op Orders    Plan: Discharge:   __x__Home       _____Floor     _____Critical Care    _____  Other:_________________    Comments: Patient tolerated procedure well

## 2022-04-04 LAB — SURGICAL PATHOLOGY STUDY: SIGNIFICANT CHANGE UP

## 2022-04-06 DIAGNOSIS — Z79.82 LONG TERM (CURRENT) USE OF ASPIRIN: ICD-10-CM

## 2022-04-06 DIAGNOSIS — D24.1 BENIGN NEOPLASM OF RIGHT BREAST: ICD-10-CM

## 2022-04-06 DIAGNOSIS — N64.2 ATROPHY OF BREAST: ICD-10-CM

## 2022-04-06 DIAGNOSIS — N63.13 UNSPECIFIED LUMP IN THE RIGHT BREAST, LOWER OUTER QUADRANT: ICD-10-CM

## 2022-04-06 DIAGNOSIS — N60.91 UNSPECIFIED BENIGN MAMMARY DYSPLASIA OF RIGHT BREAST: ICD-10-CM

## 2022-04-06 DIAGNOSIS — I10 ESSENTIAL (PRIMARY) HYPERTENSION: ICD-10-CM

## 2022-04-12 ENCOUNTER — APPOINTMENT (OUTPATIENT)
Dept: BREAST CENTER | Facility: CLINIC | Age: 62
End: 2022-04-12
Payer: MEDICAID

## 2022-04-12 VITALS
HEIGHT: 63 IN | BODY MASS INDEX: 24.8 KG/M2 | SYSTOLIC BLOOD PRESSURE: 109 MMHG | DIASTOLIC BLOOD PRESSURE: 70 MMHG | WEIGHT: 140 LBS | TEMPERATURE: 97.1 F

## 2022-04-12 PROCEDURE — 99024 POSTOP FOLLOW-UP VISIT: CPT

## 2022-04-12 NOTE — DATA REVIEWED
[FreeTextEntry1] : 4/1/2022 08:42 EDT\par \par Surgical Pathology Report - Auth (Verified)\par \par Specimen(s) Submitted\par Right breast mass\par Time obtained: 8:11 am\par \par Final Diagnosis\par Breast, right LOQ 9:00 axis mass, radiofrequency seed localized\par lumpectomy:\par - Benign atrophic fatty breast tissue with proliferative type fibrocystic\par \par changes including focal florid duct hyperplasia.\par - No residual intraductal papillary lesion, radial sclerosing lesion\par (radial scar), nor prior biopsy site is present in this entirely\par submitted specimen.\par

## 2022-04-12 NOTE — PAST MEDICAL HISTORY
[History of Hormone Replacement Treatment] : has no history of hormone replacement treatment [FreeTextEntry5] : denies  [FreeTextEntry6] : denies [FreeTextEntry7] : denies [FreeTextEntry8] : denies

## 2022-04-12 NOTE — REVIEW OF SYSTEMS
[Negative] : Constitutional [Fever] : no fever [Chills] : no chills [Breast Pain] : no breast pain [Breast Lump] : no breast lump [Nipple Discharge] : no nipple discharge [Nipple Inverted] : no inversion of the nipple

## 2022-04-12 NOTE — PHYSICAL EXAM
[de-identified] : incision healing well.\par no erythema noted.\par no drainage noted.\par Dermabond intact.

## 2022-04-12 NOTE — ASSESSMENT
[FreeTextEntry1] : NILESH MANN is a 61 year old female patient who presents today to the office for her initial post-operative visit.\par She is s/p right WLE with RF loc on 04/01/2022.\par She is feeling well.\par She denies any fever / chills or erythema and / or drainage related to the incision.\par Her pain is well controlled, only complains of mild soreness of the area.\par \par Her final pathology was discussed, showed benign atrophic fatty breast tissue with proliferative type fibrocystic\par changes including focal florid duct hyperplasia. No residual intraductal papillary lesion, radial sclerosing lesion\par (radial scar), nor prior biopsy site is present in this entirely submitted specimen.\par \par Although the final pathology did not reveal healing prior biopsy site changes, her images were re-reviewed and there are no additional sites to target for excision.\par We will follow-up with diagnostic imaging in November 2022.\par \par On physical exam, right breast shows incision healing well. no erythema noted. no drainage noted.\par Dermabond intact.\par \par Her most recent imaging was a b/l screening mammogram and subsequent R dx mammogram and US on 11/23/2021 and 12/6/2021 which revealed an indeterminate focal asymmetry in the right breast @9:00, without an US correlate, which was biopsy proven complex sclerosing papillary lesion.  Of note, her biopsy clip has migrated 2 cm inferiorly from target lesion. \par \par We discussed radial scars without atypia.  These lesions are considered fibroproliferative lesions without atypia.  Patients with these lesions were found to have a slightly increased relative risk compared to the reference population.  However the lesions themselves do not have any malignant potential. There is about a 10-20% upgrade rate to a high risk lesion (including atypical hyperplasias) and a <3% upgrade to cancer.  However, when atypia is present there is up to a 15-25% upgrade rate to cancer.  \par \par Intraductal papillomas without atypia are considered fibroproliferative lesions without atypia.  Patients with these lesions were found to have a slightly increased relative risk of breast cancer compared to the reference population.  However the lesions themselves do not have any malignant potential. \par \par Although newer studies regarding the upgrade rate (to cancer) ranges from 0-14% on surgical excision, with pathologic/radiologic concordance, older studies found a higher upgrade rate. \par \par Because her diagnosis is not clear, I have recommended surgical excision of her R breast lesion.  This is not readily palpable and will need to be localized preoperatively with an RF tag. \par \par The risks and benefits of the procedure were explained to the patient, including bleeding, infection, seroma/hematoma formation, and possible re-operation if the surgical excision yields an upgrade to cancer with positive margins. Informed consent was obtained today.\par \par She is otherwise at an average risk for breast cancer and should continue with annual screening mammogram. \par \par All of her questions were answered.  She knows to call with any further questions or concerns. \par \par PLAN:\par -B/L Dx Mammo - November 2022.\par -f/up after

## 2022-04-12 NOTE — HISTORY OF PRESENT ILLNESS
[FreeTextEntry1] : Nilesh is a Frisian speaking 61 year old female here with DX of RIGHT breast complex papillary sclerosing lesion. \par \par She has a history of breast augmentation with implants 6 years prior in [facility].  She had her implants replaced three years prior, also in [facility], and is now complaining of breast pain and is interested in removing or adjusting her implants.  \par \par Otherwise she has not palpated any abnormal masses within either breast and denies any nipple discharge or retraction. \par \par Her imaging is as follows:\par 21 B/L dx mammo and US\par -scattered areas of fibroglandular density\par -bilateral subpectoralis silicone implants\par - right breast at 9:00 location there is a focal asymmetry-->additional imaging is recommended.\par Mammo BI-RADS 0\par \par 21 RIGHT dx mammo and US\par -Indeterminate focal asymmetry in the right breast at 9:00 location without sonographic correlate-->Stereotactic   core biopsy\par BIRADS4\par \par 21 RIGHT stereotactic bx (buckle)\par - *Complex papillary sclerosing lesion* \par -PORTIONS OF COMPLEX  SCLEROSING PAPILLARY LESION  (SIZE AT LEAST 0.3 CM) INVOLVED BY FLORID  USUAL DUCT HYPERPLASIA (UDH) IN THIS SAMPLING  SLIDE A1-10\par - FIBROADENOMATOID CHANGES \par - PSEUDOANGIOMATOUS STROMAL HYPERPLASIA (PASH)\par -***biopsy clip has migrated 2 cm inferiorly \par \par HISTORICAL RISK FACTORS:\par -bilateral subpectoralis silicone implants\par -no fam hx of breast or ovarian cancer\par -, first born at age 16\par -no OCP use\par -no gyn surgery \par \par INTERVAL HISTORY:\par 2022 --\par NILESH MANN is a 61 year old female patient who presents today to the office for her initial post-operative visit.\par She is s/p right WLE with RF loc on 2022.\par She is feeling well.\par She denies any fever / chills or erythema and / or drainage related to the incision.\par Her pain is well controlled, only complains of mild soreness of the area.\par \par Her final pathology was discussed, showed benign atrophic fatty breast tissue with proliferative type fibrocystic\par changes including focal florid duct hyperplasia. No residual intraductal papillary lesion, radial sclerosing lesion\par (radial scar), nor prior biopsy site is present in this entirely submitted specimen.\par \par Although the final pathology did not reveal healing prior biopsy site changes, her images were re-reviewed and there are no additional sites to target for excision.\par We will follow-up with diagnostic imaging in 2022.\par

## 2022-06-22 ENCOUNTER — FORM ENCOUNTER (OUTPATIENT)
Age: 62
End: 2022-06-22

## 2022-09-09 ENCOUNTER — APPOINTMENT (OUTPATIENT)
Dept: CARDIOLOGY | Facility: CLINIC | Age: 62
End: 2022-09-09

## 2022-09-09 VITALS
BODY MASS INDEX: 27.48 KG/M2 | HEIGHT: 60 IN | OXYGEN SATURATION: 98 % | SYSTOLIC BLOOD PRESSURE: 120 MMHG | WEIGHT: 140 LBS | HEART RATE: 61 BPM | DIASTOLIC BLOOD PRESSURE: 78 MMHG

## 2022-09-09 DIAGNOSIS — Z01.818 ENCOUNTER FOR OTHER PREPROCEDURAL EXAMINATION: ICD-10-CM

## 2022-09-09 DIAGNOSIS — K21.9 GASTRO-ESOPHAGEAL REFLUX DISEASE W/OUT ESOPHAGITIS: ICD-10-CM

## 2022-09-09 PROCEDURE — 93000 ELECTROCARDIOGRAM COMPLETE: CPT

## 2022-09-09 PROCEDURE — 99213 OFFICE O/P EST LOW 20 MIN: CPT | Mod: 25

## 2022-09-12 NOTE — CARDIOLOGY SUMMARY
[de-identified] : 09/09/2022: NSR, normal axis, normal intervals, (-) ST-T wave changes.\par 06/23/2022: NSR, (-) ST-T wave changes.

## 2022-09-12 NOTE — PHYSICAL EXAM

## 2022-09-12 NOTE — DISCUSSION/SUMMARY
[Hypertension] : hypertension [Stable] : stable [None] : There are no changes in medication management [Exercise Regimen] : an exercise regimen [Weight Loss] : weight loss [ETOH Moderation] : alcohol moderation [Low Sodium Diet] : low sodium diet [Patient] : the patient [EKG obtained to assist in diagnosis and management of assessed problem(s)] : EKG obtained to assist in diagnosis and management of assessed problem(s) [FreeTextEntry1] : \par Chest Pain: EKG performed today was unremarkable. Recommend a stress echocardiogram to rule out CAD equivalent prior to surgery. \par \par SOB on Exertion: Due to hx of orthopnea and PND, recommend an echocardiogram to rule out CHF as equivalent given nature of symptoms. \par \par Follow up after testing is complete for pre-op risk assessment. \par \par Plan discussed with the patient.

## 2022-09-12 NOTE — HISTORY OF PRESENT ILLNESS
[FreeTextEntry1] : NILESH MANN is a 62 year-old female, with a hx of HTN, PVD, and spontaneous carotid artery dissection with resulting TIA and CVA, who presents today for preoperative cardiac evaluation prior to explantation of bilateral breast implants. Endorses SOB with exertion, which is relieved with rest, as well as substernal chest pain that is worse with exertion and relieved with rest. Pain is 8 out of 10 in intensity with radiation to the left arm. Symptoms are progressive in nature. Patient additionally has (+) orthopnea, (+) PND, and (+) LE edema. Otherwise: (-) radiation, (-) diaphoresis, (-) pleuritic component, (-) ripping or tearing quality, (-) positional component, (-) dizziness, (-) syncope, (-) nausea, (-) vomiting, (-) calf swelling/pain, (-) neuro deficits, (-) fever, (-) chills, (-) cough, (-) hemoptysis, (-) recent URI, (-) abdominal pain, (-) nausea, (-) vomiting, (-) melena, (-) hematochezia, (-) leg swelling/pain, (-) recent travel, (-) prolonged immobility.\par \par \par \par

## 2022-09-20 ENCOUNTER — APPOINTMENT (OUTPATIENT)
Dept: CARDIOLOGY | Facility: CLINIC | Age: 62
End: 2022-09-20

## 2022-09-30 ENCOUNTER — APPOINTMENT (OUTPATIENT)
Dept: CARDIOLOGY | Facility: CLINIC | Age: 62
End: 2022-09-30

## 2022-09-30 PROCEDURE — 93321 DOPPLER ECHO F-UP/LMTD STD: CPT | Mod: 26,59

## 2022-09-30 PROCEDURE — 93306 TTE W/DOPPLER COMPLETE: CPT | Mod: 26,59

## 2022-09-30 PROCEDURE — 99213 OFFICE O/P EST LOW 20 MIN: CPT

## 2022-09-30 PROCEDURE — 93351 STRESS TTE COMPLETE: CPT

## 2022-09-30 PROCEDURE — 93325 DOPPLER ECHO COLOR FLOW MAPG: CPT | Mod: 26,59

## 2022-10-03 NOTE — CARDIOLOGY SUMMARY
[de-identified] : 09/09/2022: NSR, normal axis, normal intervals, (-) ST-T wave changes.\par 06/23/2022: NSR, (-) ST-T wave changes.

## 2022-10-03 NOTE — PHYSICAL EXAM

## 2022-10-03 NOTE — REVIEW OF SYSTEMS
[Negative] : Heme/Lymph [Leg Claudication] : no intermittent leg claudication [Palpitations] : no palpitations [Syncope] : no syncope [FreeTextEntry5] : (+) SOB on exertion, (+) Chest Pain, (+) Orthopnea, (+) PND, (+) LE edema [FreeTextEntry6] : (+) SOB on exertion

## 2022-10-03 NOTE — DISCUSSION/SUMMARY
[Hypertension] : hypertension [Stable] : stable [None] : There are no changes in medication management [Exercise Regimen] : an exercise regimen [Weight Loss] : weight loss [ETOH Moderation] : alcohol moderation [Low Sodium Diet] : low sodium diet [Patient] : the patient [FreeTextEntry1] : Chest Pain: Patient unable to reach MPHR during EKG stress test today due to feeling "tired."  No ectopy or ischemic changes were visualized.  Pharmacological nuclear stress test ordered to evaluate for CAD prior to surgery.\par \par SOB on Exertion: Echocardiogram performed in office today revealed normal EF.  Trace MR/TR.  \par \par Follow up after pharmacological nuclear scan is complete for pre-operative clearance. \par \par Plan discussed with the patient.

## 2022-10-03 NOTE — HISTORY OF PRESENT ILLNESS
[FreeTextEntry1] : NILESH MANN is a 62 year-old female, with a hx of HTN, PVD, and spontaneous carotid artery dissection with resulting TIA and CVA, who presents today for echo and ekg stress test for preoperative cardiac evaluation prior to explantation of bilateral breast implants. During her recent exam in this office on 9/9/22, she endorsed SOB with exertion, relieved with rest, as well as substernal chest pain that is worse with exertion and also relieved with rest. Pain is 8 out of 10 in intensity with radiation to the left arm. Symptoms are progressive in nature. Patient additionally has (+) orthopnea, (+) PND, and (+) LE edema. Otherwise: (-) radiation, (-) diaphoresis, (-) pleuritic component, (-) ripping or tearing quality, (-) positional component, (-) dizziness, (-) syncope, (-) nausea, (-) vomiting, (-) calf swelling/pain, (-) neuro deficits, (-) fever, (-) chills, (-) cough, (-) hemoptysis, (-) recent URI, (-) abdominal pain, (-) nausea, (-) vomiting, (-) melena, (-) hematochezia, (-) leg swelling/pain, (-) recent travel, (-) prolonged immobility.\par \par \par \par

## 2022-10-05 ENCOUNTER — NON-APPOINTMENT (OUTPATIENT)
Age: 62
End: 2022-10-05

## 2022-10-05 NOTE — HISTORY OF PRESENT ILLNESS
[FreeTextEntry1] : NILESH MANN is a 62 year-old female, with a hx of HTN, PVD, and spontaneous carotid artery dissection with resulting TIA and CVA, who presents today for stress echo. Patient was last seen on 9/30/2022 for preoperative cardiac evaluation prior to explantation of bilateral breast implants. She was complaining of exertional SOB and substernal chest pain.\par \par Stress echocardiogram unable to be completed due to fatigue. \par \par \par \par

## 2022-10-05 NOTE — PHYSICAL EXAM

## 2022-10-05 NOTE — REVIEW OF SYSTEMS
[Negative] : Heme/Lymph [Fever] : no fever [Headache] : no headache [Chills] : no chills [FreeTextEntry2] : (+) Fatigue [FreeTextEntry4] : ( [FreeTextEntry5] : (+) Prior SOB on exertion, (+) Prior chest pain, (+) orthopnea, (+) PND [FreeTextEntry6] : (+) Prior SOB on exertion

## 2022-10-11 ENCOUNTER — APPOINTMENT (OUTPATIENT)
Dept: CARDIOLOGY | Facility: CLINIC | Age: 62
End: 2022-10-11

## 2022-10-11 VITALS
WEIGHT: 140 LBS | OXYGEN SATURATION: 99 % | HEART RATE: 73 BPM | HEIGHT: 63 IN | SYSTOLIC BLOOD PRESSURE: 100 MMHG | DIASTOLIC BLOOD PRESSURE: 70 MMHG | BODY MASS INDEX: 24.8 KG/M2

## 2022-10-11 PROCEDURE — 93000 ELECTROCARDIOGRAM COMPLETE: CPT

## 2022-10-11 PROCEDURE — 99213 OFFICE O/P EST LOW 20 MIN: CPT | Mod: 25

## 2022-10-12 NOTE — REVIEW OF SYSTEMS
[Negative] : Heme/Lymph [SOB] : no shortness of breath [Dyspnea on exertion] : not dyspnea during exertion [Lower Ext Edema] : no extremity edema [Leg Claudication] : no intermittent leg claudication [Palpitations] : no palpitations [Syncope] : no syncope [FreeTextEntry5] : (+) Chest Pain

## 2022-10-12 NOTE — DISCUSSION/SUMMARY
[EKG obtained to assist in diagnosis and management of assessed problem(s)] : EKG obtained to assist in diagnosis and management of assessed problem(s) [FreeTextEntry1] : EKG performed today was unremarkable.\par \par Chest Pain: The impression is atypical chest pain. Patient is pending nuclear stress test. Will follow up after stress test is performed. If chest pain persists, recommend the patient visit the ED. \par \par HTN: The impression is hypertension. Currently, the condition is stable. There are no changes in medication management. Continue current medical therapy. Other planned treatments include an exercise regimen, weight loss, low sodium diet, and alcohol moderation.\par \par Will follow up after stress test is performed. \par \par Plan was discussed with the patient.\par

## 2022-10-12 NOTE — HISTORY OF PRESENT ILLNESS
[FreeTextEntry1] : NILESH MANN is a 62-year-old female, with a hx of HTN, PVD, and spontaneous carotid artery dissection with resulting TIA and CVA, who presents today for evaluation of chest pain. Reports she had an episode of right-sided chest pain last night. Worse with anxiety and stress. Otherwise: (-) radiation, (-) diaphoresis, (-) dyspnea, (-) pleuritic component, (-) ripping or tearing quality, (-) positional component, (-) exertional component, (-) dizziness, (-) syncope, (-) nausea, (-) vomiting, (-) calf swelling/pain, (-) neuro deficits.\par \par \par \par

## 2022-11-29 ENCOUNTER — APPOINTMENT (OUTPATIENT)
Dept: BREAST CENTER | Facility: CLINIC | Age: 62
End: 2022-11-29

## 2022-11-29 VITALS
SYSTOLIC BLOOD PRESSURE: 116 MMHG | WEIGHT: 140 LBS | HEIGHT: 63 IN | BODY MASS INDEX: 24.8 KG/M2 | DIASTOLIC BLOOD PRESSURE: 72 MMHG

## 2022-11-29 DIAGNOSIS — N64.89 OTHER SPECIFIED DISORDERS OF BREAST: ICD-10-CM

## 2022-11-29 DIAGNOSIS — D24.1 BENIGN NEOPLASM OF RIGHT BREAST: ICD-10-CM

## 2022-11-29 PROCEDURE — 99212 OFFICE O/P EST SF 10 MIN: CPT

## 2022-11-29 NOTE — PHYSICAL EXAM
[Normocephalic] : normocephalic [Atraumatic] : atraumatic [EOMI] : extra ocular movement intact [Examined in the supine and seated position] : examined in the supine and seated position [Symmetrical] : symmetrical [No dominant masses] : no dominant masses in right breast  [No dominant masses] : no dominant masses left breast [No Nipple Retraction] : no left nipple retraction [No Nipple Discharge] : no left nipple discharge [No Axillary Lymphadenopathy] : no left axillary lymphadenopathy [No Edema] : no edema [No Rashes] : no rashes [No Ulceration] : no ulceration [de-identified] : On physical exam, there are no discrete masses in either breast or axilla. There is no nipple discharge or inversion bilaterally. There are no skin changes bilaterally. B/l breast implants soft and intact on palpation \par \par

## 2022-11-29 NOTE — ASSESSMENT
[FreeTextEntry1] : NILESH MANN is a 62 year old female patient who presents today s/p right WLE with RF loc on 04/01/2022 due to DX of RIGHT breast complex papillary sclerosing lesion. \par \par On physical exam, there are no discrete masses in either breast or axilla. There is no nipple discharge or inversion bilaterally. There are no skin changes bilaterally. B/l breast implants soft and intact on palpation \par \par \par AS REVIEW:\par Her final pathology was discussed, showed benign atrophic fatty breast tissue with proliferative type fibrocystic\par changes including focal florid duct hyperplasia. No residual intraductal papillary lesion, radial sclerosing lesion\par (radial scar), nor prior biopsy site is present in this entirely submitted specimen.\par \par Although the final pathology did not reveal healing prior biopsy site changes, her images were re-reviewed and there are no additional sites to target for excision.\par We will follow-up with diagnostic imaging in November 2022.\par \par Her most recent imaging was a b/l screening mammogram and subsequent R dx mammogram and US on 11/23/2021 and 12/6/2021 which revealed an indeterminate focal asymmetry in the right breast @9:00, without an US correlate, which was biopsy proven complex sclerosing papillary lesion.  Of note, her biopsy clip has migrated 2 cm inferiorly from target lesion. \par \par We discussed radial scars without atypia.  These lesions are considered fibroproliferative lesions without atypia.  Patients with these lesions were found to have a slightly increased relative risk compared to the reference population.  However the lesions themselves do not have any malignant potential. There is about a 10-20% upgrade rate to a high risk lesion (including atypical hyperplasias) and a <3% upgrade to cancer.  However, when atypia is present there is up to a 15-25% upgrade rate to cancer.  \par \par Intraductal papillomas without atypia are considered fibroproliferative lesions without atypia.  Patients with these lesions were found to have a slightly increased relative risk of breast cancer compared to the reference population.  However the lesions themselves do not have any malignant potential. \par \par Although newer studies regarding the upgrade rate (to cancer) ranges from 0-14% on surgical excision, with pathologic/radiologic concordance, older studies found a higher upgrade rate. \par \par Because her diagnosis is not clear, I have recommended surgical excision of her R breast lesion.  This is not readily palpable and will need to be localized preoperatively with an RF tag. \par \par The risks and benefits of the procedure were explained to the patient, including bleeding, infection, seroma/hematoma formation, and possible re-operation if the surgical excision yields an upgrade to cancer with positive margins. Informed consent was obtained today.\par \par She is otherwise at an average risk for breast cancer and should continue with annual screening mammogram. \par \par All of her questions were answered.  She knows to call with any further questions or concerns. \par \par PLAN:\par -B/L Dx Mammo now (we will call her with results)\par -if above is unrevealing she will be due for b/l mammo in November 2023\par -f/up after

## 2022-11-29 NOTE — HISTORY OF PRESENT ILLNESS
[FreeTextEntry1] : Nilesh is a Tajik speaking 61 year old female here with DX of RIGHT breast complex papillary sclerosing lesion. \par \par She has a history of breast augmentation with implants 6 years prior in [facility].  She had her implants replaced three years prior, also in [facility], and is now complaining of breast pain and is interested in removing or adjusting her implants.  \par \par Otherwise she has not palpated any abnormal masses within either breast and denies any nipple discharge or retraction. \par \par Her imaging is as follows:\par 21 B/L dx mammo and US\par -scattered areas of fibroglandular density\par -bilateral subpectoralis silicone implants\par - right breast at 9:00 location there is a focal asymmetry-->additional imaging is recommended.\par Mammo BI-RADS 0\par \par 21 RIGHT dx mammo and US\par -Indeterminate focal asymmetry in the right breast at 9:00 location without sonographic correlate-->Stereotactic   core biopsy\par BIRADS4\par \par 21 RIGHT stereotactic bx (buckle)\par - *Complex papillary sclerosing lesion* \par -PORTIONS OF COMPLEX  SCLEROSING PAPILLARY LESION  (SIZE AT LEAST 0.3 CM) INVOLVED BY FLORID  USUAL DUCT HYPERPLASIA (UDH) IN THIS SAMPLING  SLIDE A1-10\par - FIBROADENOMATOID CHANGES \par - PSEUDOANGIOMATOUS STROMAL HYPERPLASIA (PASH)\par -***biopsy clip has migrated 2 cm inferiorly \par \par HISTORICAL RISK FACTORS:\par -bilateral subpectoralis silicone implants\par -no fam hx of breast or ovarian cancer\par -, first born at age 16\par -no OCP use\par -no gyn surgery \par \par INTERVAL HISTORY:\par 2022 --\par NILESH MANN is a 61 year old female patient who presents today to the office for her initial post-operative visit.\par She is s/p right WLE with RF loc on 2022.\par She is feeling well.\par She denies any fever / chills or erythema and / or drainage related to the incision.\par Her pain is well controlled, only complains of mild soreness of the area.\par \par Her final pathology was discussed, showed benign atrophic fatty breast tissue with proliferative type fibrocystic\par changes including focal florid duct hyperplasia. No residual intraductal papillary lesion, radial sclerosing lesion\par (radial scar), nor prior biopsy site is present in this entirely submitted specimen.\par \par Although the final pathology did not reveal healing prior biopsy site changes, her images were re-reviewed and there are no additional sites to target for excision.\par We will follow-up with diagnostic imaging in 2022.\par \par \par INTERVAL HISTORY 22  #[id number] Eliel\nuvia Enciso is here for her six months follow up \par She has no breast related complaints at this time.  She denies any breast pain, has not palpated any new palpable masses in either breast and denies any nipple discharge or retraction.\par \par She did not yet get her imaging and will be scheduled for her mammo now

## 2022-12-20 ENCOUNTER — OUTPATIENT (OUTPATIENT)
Dept: OUTPATIENT SERVICES | Facility: HOSPITAL | Age: 62
LOS: 1 days | Discharge: HOME | End: 2022-12-20

## 2022-12-20 ENCOUNTER — RESULT REVIEW (OUTPATIENT)
Age: 62
End: 2022-12-20

## 2022-12-20 DIAGNOSIS — Z98.82 BREAST IMPLANT STATUS: Chronic | ICD-10-CM

## 2022-12-20 DIAGNOSIS — R92.8 OTHER ABNORMAL AND INCONCLUSIVE FINDINGS ON DIAGNOSTIC IMAGING OF BREAST: ICD-10-CM

## 2022-12-20 PROCEDURE — 77066 DX MAMMO INCL CAD BI: CPT | Mod: 26

## 2022-12-20 PROCEDURE — 77062 BREAST TOMOSYNTHESIS BI: CPT | Mod: 26

## 2023-01-10 ENCOUNTER — APPOINTMENT (OUTPATIENT)
Dept: CARDIOLOGY | Facility: CLINIC | Age: 63
End: 2023-01-10
Payer: MEDICAID

## 2023-01-10 PROCEDURE — 99213 OFFICE O/P EST LOW 20 MIN: CPT | Mod: 25

## 2023-01-10 PROCEDURE — 93000 ELECTROCARDIOGRAM COMPLETE: CPT

## 2023-01-11 NOTE — HISTORY OF PRESENT ILLNESS
[FreeTextEntry1] : NILESH MANN is a 62-year-old female, with a hx of HTN, PVD, and spontaneous carotid artery dissection with resulting TIA and CVA, who presents today for a follow up visit. States her BP has been fluctuating since [place]. She has doubled up on medications. Denies any other complaints. Otherwise: (-) chest pain, (-) dyspnea, (-) visual symptoms, (-) focal weakness.\par \par

## 2023-01-11 NOTE — DISCUSSION/SUMMARY
[EKG obtained to assist in diagnosis and management of assessed problem(s)] : EKG obtained to assist in diagnosis and management of assessed problem(s) [FreeTextEntry1] : EKG performed today was unremarkable.\par \par HTN: The impression is hypertension. Instructing to continue maintaining BP diary at home. I suspect her anxiety is triggering her HTN. Recommend primary follow up for anxiety medications. There are no changes in medication management at this time. Continue current medical therapy. Other planned treatments include an exercise regimen, weight loss, low sodium diet, and alcohol moderation.\par \par Instructed to follow up after testing is complete.  \par Plan was discussed with the patient.

## 2023-06-13 ENCOUNTER — APPOINTMENT (OUTPATIENT)
Dept: CARDIOLOGY | Facility: CLINIC | Age: 63
End: 2023-06-13
Payer: MEDICAID

## 2023-06-13 VITALS
BODY MASS INDEX: 25.52 KG/M2 | HEIGHT: 63 IN | HEART RATE: 65 BPM | SYSTOLIC BLOOD PRESSURE: 110 MMHG | DIASTOLIC BLOOD PRESSURE: 76 MMHG | WEIGHT: 144 LBS | OXYGEN SATURATION: 100 %

## 2023-06-13 DIAGNOSIS — R09.89 OTHER SPECIFIED SYMPTOMS AND SIGNS INVOLVING THE CIRCULATORY AND RESPIRATORY SYSTEMS: ICD-10-CM

## 2023-06-13 PROCEDURE — 93880 EXTRACRANIAL BILAT STUDY: CPT

## 2023-06-13 PROCEDURE — 99214 OFFICE O/P EST MOD 30 MIN: CPT

## 2023-06-13 RX ORDER — OMEPRAZOLE 20 MG/1
20 CAPSULE, DELAYED RELEASE ORAL DAILY
Qty: 30 | Refills: 5 | Status: ACTIVE | COMMUNITY
Start: 2022-09-09 | End: 1900-01-01

## 2023-06-14 NOTE — DISCUSSION/SUMMARY
[FreeTextEntry1] : H/O Carotid Artery Dissection: Carotid US performed today revealed mild plaque in the right ICA; unchanged from prior. Continue Aspirin 81 mg QD. \par \par Palpitations: Recommend an MCOT Telemetry monitor to evaluate for etiology of palpitations.\par \par HTN: The impression is hypertension. Currently, the condition is stable. There are no changes in medication management. Continue current medical therapy. Other planned treatments include an exercise regimen, weight loss, low sodium diet, and alcohol moderation.\par \par Results and plan discussed with the patient.

## 2023-06-14 NOTE — HISTORY OF PRESENT ILLNESS
[FreeTextEntry1] : NILESH MANN is a 62-year-old female, with a hx of HTN, PVD, and spontaneous carotid artery dissection with resulting TIA and CVA, who presents today for a follow-up visit. Patient endorses palpitations occurring daily. Currently on Diovan 160 mg BID and Metoprolol 50 mg BID. Otherwise: (-) chest pain, (-) dyspnea, (-) diaphoresis, (-) hemoptysis, (-) syncope, (-) travel.

## 2023-09-01 ENCOUNTER — APPOINTMENT (OUTPATIENT)
Dept: CARDIOLOGY | Facility: CLINIC | Age: 63
End: 2023-09-01
Payer: MEDICAID

## 2023-09-01 VITALS
BODY MASS INDEX: 25.34 KG/M2 | HEART RATE: 60 BPM | SYSTOLIC BLOOD PRESSURE: 131 MMHG | HEIGHT: 63 IN | DIASTOLIC BLOOD PRESSURE: 79 MMHG | OXYGEN SATURATION: 100 % | WEIGHT: 143 LBS

## 2023-09-01 PROCEDURE — 93000 ELECTROCARDIOGRAM COMPLETE: CPT

## 2023-09-01 PROCEDURE — 99214 OFFICE O/P EST MOD 30 MIN: CPT | Mod: 25

## 2023-09-06 NOTE — DISCUSSION/SUMMARY
[EKG obtained to assist in diagnosis and management of assessed problem(s)] : EKG obtained to assist in diagnosis and management of assessed problem(s) [FreeTextEntry1] : TIA: The impression is transient ischemic attack. There are no changes in medication management. Continue Aspirin 81 mg QD and Plavix 75 mg QD. Follow up with neurology. No further cardiac intervention warranted at the current time.  HTN: The impression is hypertension. Currently, the condition is controlled. There are no changes in medication management. Continue current medical therapy. Other planned treatments include an exercise regimen, weight loss, low sodium diet, and alcohol moderation.  HLD: The impression is hyperlipidemia. There are no changes in medication management. Continue current medical therapy. Other planned treatment includes diet modification, exercise, and weight loss.  Plan was discussed with the patient.

## 2023-09-06 NOTE — HISTORY OF PRESENT ILLNESS
[FreeTextEntry1] : NILESH MANN is a 63-year-old female, with a hx of HTN, HLD, PVD, spontaneous carotid artery dissection with resulting TIA, and recently diagnosed TIA at [facility], who presents today for a follow-up visit. Patient was admitted to [facility] on 8/23/2023 for complaints of high blood pressure and left-sided facial numbness. An MRI was done which was negative for CVA. Patient was started on Plavix 75 mg. Denies any other complaints. Otherwise: (-) chest pain, (-) SOB.

## 2023-09-20 ENCOUNTER — APPOINTMENT (OUTPATIENT)
Dept: NEUROLOGY | Facility: CLINIC | Age: 63
End: 2023-09-20

## 2023-11-10 NOTE — ED CDU PROVIDER INITIAL DAY NOTE - INDICATION FOR OBSERVATION
Diagnostic Uncertainty Spiral Flap Text: The defect edges were debeveled with a #15 scalpel blade. Given the location of the defect, shape of the defect and the proximity to free margins a spiral flap was deemed most appropriate. Using a sterile surgical marker, an appropriate rotation flap was drawn incorporating the defect and placing the expected incisions within the relaxed skin tension lines where possible. The area thus outlined was incised deep to adipose tissue with a #15 scalpel blade. The skin margins were undermined to an appropriate distance in all directions utilizing iris scissors. Following this, the designed flap was carried over into the primary defect and sutured into place.

## 2023-11-20 ENCOUNTER — APPOINTMENT (OUTPATIENT)
Dept: CARDIOLOGY | Facility: CLINIC | Age: 63
End: 2023-11-20
Payer: MEDICAID

## 2023-11-20 VITALS — HEIGHT: 60 IN | OXYGEN SATURATION: 98 % | WEIGHT: 135 LBS | HEART RATE: 71 BPM | BODY MASS INDEX: 26.5 KG/M2

## 2023-11-20 DIAGNOSIS — R10.9 UNSPECIFIED ABDOMINAL PAIN: ICD-10-CM

## 2023-11-20 DIAGNOSIS — Z86.73 PERSONAL HISTORY OF TRANSIENT ISCHEMIC ATTACK (TIA), AND CEREBRAL INFARCTION W/OUT RESIDUAL DEFICITS: ICD-10-CM

## 2023-11-20 PROCEDURE — 93000 ELECTROCARDIOGRAM COMPLETE: CPT

## 2023-11-20 PROCEDURE — 99214 OFFICE O/P EST MOD 30 MIN: CPT | Mod: 25

## 2023-11-28 NOTE — ED PROVIDER NOTE - OBSERVING MD:
Last refill   9/25/2023    Last office visit 11/15/2023    Upcoming appointment 3/15/2024    Last labs 11/3/2023    Routed to PCP   Rx E-Prescribed   Refilled per protocol     
Andreas

## 2024-01-03 RX ORDER — VALSARTAN 160 MG/1
160 TABLET ORAL DAILY
Qty: 90 | Refills: 3 | Status: DISCONTINUED | COMMUNITY
Start: 2023-09-01 | End: 2024-01-03

## 2024-01-03 RX ORDER — CLOPIDOGREL BISULFATE 75 MG/1
75 TABLET, FILM COATED ORAL DAILY
Qty: 90 | Refills: 3 | Status: ACTIVE | COMMUNITY
Start: 1900-01-01 | End: 1900-01-01

## 2024-01-05 RX ORDER — VALSARTAN 160 MG/1
160 TABLET ORAL TWICE DAILY
Qty: 180 | Refills: 3 | Status: ACTIVE | COMMUNITY
Start: 2024-01-05 | End: 1900-01-01

## 2024-01-05 RX ORDER — VALSARTAN 160 MG/1
160 TABLET, COATED ORAL TWICE DAILY
Qty: 180 | Refills: 3 | Status: DISCONTINUED | COMMUNITY
Start: 1900-01-01 | End: 2024-01-05

## 2024-02-15 ENCOUNTER — APPOINTMENT (OUTPATIENT)
Dept: CARDIOLOGY | Facility: CLINIC | Age: 64
End: 2024-02-15
Payer: MEDICAID

## 2024-02-15 VITALS
OXYGEN SATURATION: 97 % | WEIGHT: 142 LBS | DIASTOLIC BLOOD PRESSURE: 80 MMHG | BODY MASS INDEX: 25.16 KG/M2 | HEIGHT: 63 IN | HEART RATE: 72 BPM | SYSTOLIC BLOOD PRESSURE: 108 MMHG

## 2024-02-15 DIAGNOSIS — R06.02 SHORTNESS OF BREATH: ICD-10-CM

## 2024-02-15 DIAGNOSIS — R00.2 PALPITATIONS: ICD-10-CM

## 2024-02-15 PROCEDURE — 93000 ELECTROCARDIOGRAM COMPLETE: CPT

## 2024-02-15 PROCEDURE — 99214 OFFICE O/P EST MOD 30 MIN: CPT | Mod: 25

## 2024-02-15 RX ORDER — METOPROLOL TARTRATE 50 MG/1
50 TABLET, FILM COATED ORAL
Qty: 270 | Refills: 3 | Status: ACTIVE | COMMUNITY
Start: 2024-02-15 | End: 1900-01-01

## 2024-02-21 NOTE — ASU PREOP CHECKLIST - VIA
Per Dr. Magallanes, mailed copy of recent lab results to patient's home address [address] [place] [id number].  
ambulate

## 2024-02-22 NOTE — REVIEW OF SYSTEMS
[Negative] : Heme/Lymph [Chest Discomfort] : no chest discomfort [Lower Ext Edema] : no extremity edema [Syncope] : no syncope [Leg Claudication] : no intermittent leg claudication [Cough] : no cough [FreeTextEntry5] : (+) Palpitations, (+) SOB on Exertion [FreeTextEntry6] : (+) SOB on Exertion

## 2024-02-22 NOTE — CARDIOLOGY SUMMARY
[de-identified] : TTE - 09/30/2022: CONCLUSIONS 1. Normal right ventricular size and systolic function. 2. The left atrium is normal in size. 3. Normal aortic valve, without any evidence of aortic stenosis or regurgitation. 4. Mild pulmonic regurgitation. 5. Normal mitral valve structure and function. No mitral stenosis or significant regurgitation. 6. Tricuspid valve is structurally normal with no stenosis or significant regurgitation. ======================================================= [de-identified] : 02/15/2024: NSR, normal axis, normal intervals, (-) ST-T wave changes. ======================================================= [de-identified] : Carotid Doppler - 06/13/2023: CONCLUSIONS: 1. Right: proximal ICA no stenosis. 2. Left: proximal ICA no stenosis. 3. Vertebral arteries: antegrade flow bilaterally. 4. Subclavian arteries: no significant atherosclerosis bilaterally. =======================================================

## 2024-02-22 NOTE — HISTORY OF PRESENT ILLNESS
[FreeTextEntry1] : NILESH MANN is a 63-year-old female, with a hx of HTN, HLD, PVD, spontaneous carotid artery dissection with resulting TIA, and recently diagnosed TIA at [facility], who presents today for a follow-up visit. Patient complains of palpitations, stating she can feel the palpitations on her chest wall. Additionally, she endorses SOB with exertion, which is relieved with rest, as well as chest pain that radiates to the left arm. Otherwise: (-) diaphoresis, (-) pleuritic component, (-) ripping or tearing quality, (-) positional component, (-) dizziness, (-) syncope, (-) cough, (-) hemoptysis, (-) abdominal pain, (-) leg swelling/pain, (-) recent travel, (-) prolonged immobility.

## 2024-02-22 NOTE — DISCUSSION/SUMMARY
[EKG obtained to assist in diagnosis and management of assessed problem(s)] : EKG obtained to assist in diagnosis and management of assessed problem(s) [FreeTextEntry1] : EKG performed today was unremarkable.  Palpitations: Likely musculoskeletal in etiology. Patient states she can feel it on her chest wall.   SOB on Exertion/Chest Pain: Patient has a known hx of SCAD and is high risk for coronary events. Due to exertional nature of symptoms, recommend a stress echocardiogram to further evaluate.   HTN: The impression is hypertension. Currently, the condition is controlled. There are no changes in medication management. Continue current medical therapy. Other planned treatments include an exercise regimen, weight loss, low sodium diet, and alcohol moderation.  HLD: The impression is hyperlipidemia. Currently, the condition is stable. There are no changes in medication management. Continue current medical therapy. Other planned treatment includes diet modification, exercise, and weight loss.  Instructed to follow up after testing is complete.  Plan was discussed with the patient.

## 2024-03-08 ENCOUNTER — APPOINTMENT (OUTPATIENT)
Dept: CARDIOLOGY | Facility: CLINIC | Age: 64
End: 2024-03-08
Payer: MEDICAID

## 2024-03-08 ENCOUNTER — APPOINTMENT (OUTPATIENT)
Dept: CARDIOLOGY | Facility: CLINIC | Age: 64
End: 2024-03-08

## 2024-03-08 PROCEDURE — 93015 CV STRESS TEST SUPVJ I&R: CPT

## 2024-04-05 NOTE — DISCUSSION/SUMMARY
Left a massage Urine cultures are still pending. I sent Rx for Keflex to [facility] pharmacy while waiting for urine cultures. Please let patient know.    [FreeTextEntry1] : Stress echocardiogram unable to be completed due to fatigue.\par Patient will require a nuclear stress test prior to preoperative cardiac evaluation. \par \par HTN: The impression is hypertension. Currently, the condition is stable. There are no changes in medication management. Continue current medical therapy. Other planned treatments include an exercise regimen, weight loss, low sodium diet, and alcohol moderation.\par \par Plan discussed with the patient.

## 2024-05-16 ENCOUNTER — APPOINTMENT (OUTPATIENT)
Dept: CARDIOLOGY | Facility: CLINIC | Age: 64
End: 2024-05-16
Payer: MEDICAID

## 2024-05-16 VITALS
WEIGHT: 145 LBS | BODY MASS INDEX: 25.69 KG/M2 | OXYGEN SATURATION: 95 % | SYSTOLIC BLOOD PRESSURE: 128 MMHG | HEART RATE: 70 BPM | HEIGHT: 63 IN | DIASTOLIC BLOOD PRESSURE: 88 MMHG

## 2024-05-16 DIAGNOSIS — Z86.79 PERSONAL HISTORY OF OTHER DISEASES OF THE CIRCULATORY SYSTEM: ICD-10-CM

## 2024-05-16 DIAGNOSIS — E78.5 HYPERLIPIDEMIA, UNSPECIFIED: ICD-10-CM

## 2024-05-16 PROCEDURE — 99214 OFFICE O/P EST MOD 30 MIN: CPT | Mod: 25

## 2024-05-16 PROCEDURE — 93000 ELECTROCARDIOGRAM COMPLETE: CPT

## 2024-05-16 PROCEDURE — G2211 COMPLEX E/M VISIT ADD ON: CPT | Mod: NC,1L

## 2024-05-16 RX ORDER — ATORVASTATIN CALCIUM 80 MG/1
80 TABLET, FILM COATED ORAL
Qty: 90 | Refills: 3 | Status: ACTIVE | COMMUNITY
Start: 2024-05-16 | End: 1900-01-01

## 2024-05-17 NOTE — REVIEW OF SYSTEMS
[Negative] : Heme/Lymph [Chest Discomfort] : no chest discomfort [Lower Ext Edema] : no extremity edema [Leg Claudication] : no intermittent leg claudication [Syncope] : no syncope [Cough] : no cough [FreeTextEntry5] : (+) Palpitations, (+) SOB on Exertion [FreeTextEntry6] : (+) SOB on Exertion

## 2024-05-17 NOTE — HISTORY OF PRESENT ILLNESS
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 Patient had a recent CVA with a CTA Neck performed at [facility], revealing moderate to severe stenosis of the left ICA. As per patient, neurology and vascular work up were not done. Notes she developed left arm weakness, which has now resolved.  MED

## 2024-05-17 NOTE — CARDIOLOGY SUMMARY
[de-identified] : 05/16/2024: NSR, normal axis, normal intervals, (-) ST-T wave changes. ======================================================= [de-identified] : TTE - 09/30/2022: CONCLUSIONS 1. Normal right ventricular size and systolic function. 2. The left atrium is normal in size. 3. Normal aortic valve, without any evidence of aortic stenosis or regurgitation. 4. Mild pulmonic regurgitation. 5. Normal mitral valve structure and function. No mitral stenosis or significant regurgitation. 6. Tricuspid valve is structurally normal with no stenosis or significant regurgitation. ======================================================= [de-identified] : Carotid Doppler - 06/13/2023: CONCLUSIONS: 1. Right: proximal ICA no stenosis. 2. Left: proximal ICA no stenosis. 3. Vertebral arteries: antegrade flow bilaterally. 4. Subclavian arteries: no significant atherosclerosis bilaterally. =======================================================

## 2024-05-17 NOTE — DISCUSSION/SUMMARY
[EKG obtained to assist in diagnosis and management of assessed problem(s)] : EKG obtained to assist in diagnosis and management of assessed problem(s) [FreeTextEntry1] : EKG performed today was unremarkable.  CVA: The impression is cerebrovascular accident. There are no changes in medication management. Continue current medical therapy. Recommend obtaining prior images of CT. Also recommend consultation with Dr. Swain to evaluate for possible intervention.   HTN: The impression is hypertension. Start Amlodipine 2.5 mg QD. Continue other current medical therapy.  Other planned treatments include an exercise regimen, weight loss, low sodium diet, and alcohol moderation.  HLD: The impression is hyperlipidemia. Atorvastatin increased to 80 mg QD. Other planned treatment includes diet modification, exercise, and weight loss.  Plan was discussed with the patient.

## 2024-05-22 ENCOUNTER — APPOINTMENT (OUTPATIENT)
Dept: CARDIOLOGY | Facility: CLINIC | Age: 64
End: 2024-05-22
Payer: MEDICAID

## 2024-05-22 VITALS
SYSTOLIC BLOOD PRESSURE: 118 MMHG | HEIGHT: 63 IN | BODY MASS INDEX: 25.69 KG/M2 | WEIGHT: 145 LBS | HEART RATE: 63 BPM | DIASTOLIC BLOOD PRESSURE: 72 MMHG

## 2024-05-22 PROCEDURE — 99204 OFFICE O/P NEW MOD 45 MIN: CPT

## 2024-05-22 RX ORDER — VALSARTAN 40 MG/1
TABLET ORAL
Refills: 0 | Status: DISCONTINUED | COMMUNITY
End: 2024-05-22

## 2024-05-22 RX ORDER — ATORVASTATIN CALCIUM 40 MG/1
40 TABLET, FILM COATED ORAL
Qty: 90 | Refills: 3 | Status: DISCONTINUED | COMMUNITY
End: 2024-05-22

## 2024-05-22 RX ORDER — METOPROLOL TARTRATE 50 MG/1
50 TABLET, FILM COATED ORAL TWICE DAILY
Qty: 180 | Refills: 3 | Status: DISCONTINUED | COMMUNITY
End: 2024-05-22

## 2024-05-22 RX ORDER — VALSARTAN 160 MG/1
160 TABLET, COATED ORAL
Qty: 180 | Refills: 3 | Status: DISCONTINUED | COMMUNITY
Start: 2024-02-15 | End: 2024-05-22

## 2024-05-22 RX ORDER — METHOCARBAMOL 500 MG/1
500 TABLET, FILM COATED ORAL AT BEDTIME
Qty: 90 | Refills: 0 | Status: DISCONTINUED | COMMUNITY
Start: 2019-09-18 | End: 2024-05-22

## 2024-05-22 NOTE — ASSESSMENT
[FreeTextEntry1] : # TIA (05/02/24) # Hx L ICA Dissection  # Hx 2 CVAs  # HTN   Plan  - Patient has CTA results from outside hospitals, Have radiology upload it using the CD she has with her - Get blood work for Cr - US Carotids B/L  - CCTA heart (association with SCAD)  - Refer to Neurology   I was physically present with the Resident/Fellow at the time of the visit. I agree with the findings, assessment and plan as written, unless noted below.

## 2024-05-22 NOTE — HISTORY OF PRESENT ILLNESS
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 Patient had a recent CVA with a CTA Neck performed at [facility], revealing moderate to severe stenosis of the left ICA. As per patient, neurology and vascular work up were not done. Notes she developed left arm weakness, which has now resolved. Denies visual symptoms. Denies active complaints at the moment.   At this visit the following were reviewed  /72  EKG NSR, Rate 63  CTA Head/Neck report with patient: Mod-Severe ICA stenosis, normal perfusion to brain.

## 2024-05-29 ENCOUNTER — APPOINTMENT (OUTPATIENT)
Dept: CARDIOLOGY | Facility: CLINIC | Age: 64
End: 2024-05-29
Payer: MEDICAID

## 2024-05-29 ENCOUNTER — OUTPATIENT (OUTPATIENT)
Dept: OUTPATIENT SERVICES | Facility: HOSPITAL | Age: 64
LOS: 1 days | End: 2024-05-29
Payer: MEDICAID

## 2024-05-29 DIAGNOSIS — I10 ESSENTIAL (PRIMARY) HYPERTENSION: ICD-10-CM

## 2024-05-29 DIAGNOSIS — Z98.82 BREAST IMPLANT STATUS: Chronic | ICD-10-CM

## 2024-05-29 LAB
ALBUMIN SERPL ELPH-MCNC: 4.2 G/DL
ALP BLD-CCNC: 83 U/L
ALT SERPL-CCNC: 14 U/L
ANION GAP SERPL CALC-SCNC: 13 MMOL/L
AST SERPL-CCNC: 20 U/L
BILIRUB SERPL-MCNC: 0.4 MG/DL
BUN SERPL-MCNC: 17 MG/DL
CALCIUM SERPL-MCNC: 9.8 MG/DL
CHLORIDE SERPL-SCNC: 105 MMOL/L
CO2 SERPL-SCNC: 24 MMOL/L
CREAT SERPL-MCNC: 0.7 MG/DL
EGFR: 98 ML/MIN/1.73M2
GLUCOSE SERPL-MCNC: 109 MG/DL
POTASSIUM SERPL-SCNC: 3.5 MMOL/L
PROT SERPL-MCNC: 6.5 G/DL
SODIUM SERPL-SCNC: 142 MMOL/L

## 2024-05-29 PROCEDURE — 80053 COMPREHEN METABOLIC PANEL: CPT

## 2024-05-29 PROCEDURE — 93880 EXTRACRANIAL BILAT STUDY: CPT

## 2024-05-30 DIAGNOSIS — I10 ESSENTIAL (PRIMARY) HYPERTENSION: ICD-10-CM

## 2024-06-05 ENCOUNTER — APPOINTMENT (OUTPATIENT)
Dept: CARDIOLOGY | Facility: CLINIC | Age: 64
End: 2024-06-05

## 2024-07-01 ENCOUNTER — APPOINTMENT (OUTPATIENT)
Dept: CARDIOLOGY | Facility: CLINIC | Age: 64
End: 2024-07-01
Payer: MEDICAID

## 2024-07-01 VITALS
DIASTOLIC BLOOD PRESSURE: 86 MMHG | OXYGEN SATURATION: 98 % | HEIGHT: 63 IN | RESPIRATION RATE: 16 BRPM | SYSTOLIC BLOOD PRESSURE: 124 MMHG | BODY MASS INDEX: 25.69 KG/M2 | HEART RATE: 66 BPM | WEIGHT: 145 LBS

## 2024-07-01 DIAGNOSIS — I10 ESSENTIAL (PRIMARY) HYPERTENSION: ICD-10-CM

## 2024-07-01 DIAGNOSIS — R07.9 CHEST PAIN, UNSPECIFIED: ICD-10-CM

## 2024-07-01 DIAGNOSIS — E78.49 OTHER HYPERLIPIDEMIA: ICD-10-CM

## 2024-07-01 DIAGNOSIS — I63.9 CEREBRAL INFARCTION, UNSPECIFIED: ICD-10-CM

## 2024-07-01 PROCEDURE — G2211 COMPLEX E/M VISIT ADD ON: CPT | Mod: NC,1L

## 2024-07-01 PROCEDURE — 99214 OFFICE O/P EST MOD 30 MIN: CPT

## 2024-07-10 ENCOUNTER — RESULT REVIEW (OUTPATIENT)
Age: 64
End: 2024-07-10

## 2024-07-10 ENCOUNTER — OUTPATIENT (OUTPATIENT)
Dept: OUTPATIENT SERVICES | Facility: HOSPITAL | Age: 64
LOS: 1 days | End: 2024-07-10
Payer: MEDICAID

## 2024-07-10 DIAGNOSIS — Z98.82 BREAST IMPLANT STATUS: Chronic | ICD-10-CM

## 2024-07-10 DIAGNOSIS — I10 ESSENTIAL (PRIMARY) HYPERTENSION: ICD-10-CM

## 2024-07-10 DIAGNOSIS — Z00.8 ENCOUNTER FOR OTHER GENERAL EXAMINATION: ICD-10-CM

## 2024-07-10 PROCEDURE — 75574 CT ANGIO HRT W/3D IMAGE: CPT

## 2024-07-10 PROCEDURE — 75574 CT ANGIO HRT W/3D IMAGE: CPT | Mod: 26

## 2024-07-11 DIAGNOSIS — I10 ESSENTIAL (PRIMARY) HYPERTENSION: ICD-10-CM

## 2024-07-18 ENCOUNTER — APPOINTMENT (OUTPATIENT)
Dept: CARDIOLOGY | Facility: CLINIC | Age: 64
End: 2024-07-18
Payer: MEDICAID

## 2024-07-18 PROCEDURE — 93975 VASCULAR STUDY: CPT

## 2024-08-14 ENCOUNTER — RX RENEWAL (OUTPATIENT)
Age: 64
End: 2024-08-14

## 2024-11-19 NOTE — STROKE CODE NOTE - STROKE TEAM ASSESSMENT
Problem: PAIN - ADULT  Goal: Verbalizes/displays adequate comfort level or baseline comfort level  Description: Interventions:  - Encourage patient to monitor pain and request assistance  - Assess pain using appropriate pain scale  - Administer analgesics based on type and severity of pain and evaluate response  - Implement non-pharmacological measures as appropriate and evaluate response  - Consider cultural and social influences on pain and pain management  - Notify physician/advanced practitioner if interventions unsuccessful or patient reports new pain  Outcome: Progressing     Problem: INFECTION - ADULT  Goal: Absence or prevention of progression during hospitalization  Description: INTERVENTIONS:  - Assess and monitor for signs and symptoms of infection  - Monitor lab/diagnostic results  - Monitor all insertion sites, i.e. indwelling lines, tubes, and drains  - Monitor endotracheal if appropriate and nasal secretions for changes in amount and color  - [place] appropriate cooling/warming therapies per order  - Administer medications as ordered  - Instruct and encourage patient and family to use good hand hygiene technique  - Identify and instruct in appropriate isolation precautions for identified infection/condition  Outcome: Progressing     Problem: SAFETY ADULT  Goal: Patient will remain free of falls  Description: INTERVENTIONS:  - Educate patient/family on patient safety including physical limitations  - Instruct patient to call for assistance with activity   - Consult OT/PT to assist with strengthening/mobility   - Keep Call bell within reach  - Keep bed low and locked with side rails adjusted as appropriate  - Keep care items and personal belongings within reach  - Initiate and maintain comfort rounds  - Make Fall Risk Sign visible to staff  - Offer Toileting every 2 Hours, in advance of need  - Initiate/Maintain alarm  - Obtain necessary fall risk management equipment:   - Apply yellow socks and  bracelet for high fall risk patients  - Consider moving patient to room near nurses station  Outcome: Progressing     Problem: DISCHARGE PLANNING  Goal: Discharge to home or other facility with appropriate resources  Description: INTERVENTIONS:  - Identify barriers to discharge w/patient and caregiver  - Arrange for needed discharge resources and transportation as appropriate  - Identify discharge learning needs (meds, wound care, etc.)  - Arrange for interpretive services to assist at discharge as needed  - Refer to Case Management Department for coordinating discharge planning if the patient needs post-hospital services based on physician/advanced practitioner order or complex needs related to functional status, cognitive ability, or social support system  Outcome: Progressing     Problem: Knowledge Deficit  Goal: Patient/family/caregiver demonstrates understanding of disease process, treatment plan, medications, and discharge instructions  Description: Complete learning assessment and assess knowledge base.  Interventions:  - Provide teaching at level of understanding  - Provide teaching via preferred learning methods  Outcome: Progressing      29-Jul-2019 04:10

## 2024-12-27 ENCOUNTER — RX RENEWAL (OUTPATIENT)
Age: 64
End: 2024-12-27

## 2025-01-07 ENCOUNTER — NON-APPOINTMENT (OUTPATIENT)
Age: 65
End: 2025-01-07

## 2025-01-07 ENCOUNTER — APPOINTMENT (OUTPATIENT)
Dept: CARDIOLOGY | Facility: CLINIC | Age: 65
End: 2025-01-07
Payer: MEDICAID

## 2025-01-07 VITALS
DIASTOLIC BLOOD PRESSURE: 110 MMHG | HEIGHT: 63 IN | BODY MASS INDEX: 25.34 KG/M2 | HEART RATE: 68 BPM | OXYGEN SATURATION: 100 % | WEIGHT: 143 LBS | SYSTOLIC BLOOD PRESSURE: 170 MMHG

## 2025-01-07 DIAGNOSIS — I10 ESSENTIAL (PRIMARY) HYPERTENSION: ICD-10-CM

## 2025-01-07 DIAGNOSIS — E78.5 HYPERLIPIDEMIA, UNSPECIFIED: ICD-10-CM

## 2025-01-07 DIAGNOSIS — Z86.73 PERSONAL HISTORY OF TRANSIENT ISCHEMIC ATTACK (TIA), AND CEREBRAL INFARCTION W/OUT RESIDUAL DEFICITS: ICD-10-CM

## 2025-01-07 DIAGNOSIS — I63.9 CEREBRAL INFARCTION, UNSPECIFIED: ICD-10-CM

## 2025-01-07 PROCEDURE — 93000 ELECTROCARDIOGRAM COMPLETE: CPT

## 2025-01-07 PROCEDURE — 99214 OFFICE O/P EST MOD 30 MIN: CPT | Mod: 25

## 2025-01-07 RX ORDER — AMLODIPINE BESYLATE 2.5 MG/1
2.5 TABLET ORAL DAILY
Qty: 30 | Refills: 5 | Status: ACTIVE | COMMUNITY
Start: 2025-01-07 | End: 1900-01-01

## 2025-04-01 ENCOUNTER — RX RENEWAL (OUTPATIENT)
Age: 65
End: 2025-04-01

## 2025-04-23 ENCOUNTER — APPOINTMENT (OUTPATIENT)
Dept: CARDIOLOGY | Facility: CLINIC | Age: 65
End: 2025-04-23

## 2025-05-28 ENCOUNTER — RX RENEWAL (OUTPATIENT)
Age: 65
End: 2025-05-28

## 2025-06-26 ENCOUNTER — APPOINTMENT (OUTPATIENT)
Dept: CARDIOLOGY | Facility: CLINIC | Age: 65
End: 2025-06-26

## 2025-06-26 VITALS
SYSTOLIC BLOOD PRESSURE: 120 MMHG | HEIGHT: 63 IN | DIASTOLIC BLOOD PRESSURE: 80 MMHG | HEART RATE: 77 BPM | OXYGEN SATURATION: 96 % | BODY MASS INDEX: 26.05 KG/M2 | WEIGHT: 147 LBS

## 2025-06-26 PROBLEM — I65.29 CAROTID ARTERY STENOSIS: Status: ACTIVE | Noted: 2025-06-26

## 2025-06-26 PROCEDURE — 93000 ELECTROCARDIOGRAM COMPLETE: CPT

## 2025-06-26 PROCEDURE — 99214 OFFICE O/P EST MOD 30 MIN: CPT

## 2025-06-26 PROCEDURE — G2211 COMPLEX E/M VISIT ADD ON: CPT | Mod: NC

## 2025-07-10 ENCOUNTER — APPOINTMENT (OUTPATIENT)
Dept: CARDIOLOGY | Facility: CLINIC | Age: 65
End: 2025-07-10

## 2025-07-24 ENCOUNTER — APPOINTMENT (OUTPATIENT)
Dept: CARDIOLOGY | Facility: CLINIC | Age: 65
End: 2025-07-24

## 2025-07-28 ENCOUNTER — APPOINTMENT (OUTPATIENT)
Dept: CARDIOLOGY | Facility: CLINIC | Age: 65
End: 2025-07-28
Payer: MEDICAID

## 2025-07-28 VITALS
DIASTOLIC BLOOD PRESSURE: 72 MMHG | HEART RATE: 63 BPM | BODY MASS INDEX: 25.52 KG/M2 | WEIGHT: 144 LBS | SYSTOLIC BLOOD PRESSURE: 118 MMHG | HEIGHT: 63 IN

## 2025-07-28 DIAGNOSIS — I77.3 ARTERIAL FIBROMUSCULAR DYSPLASIA: ICD-10-CM

## 2025-07-28 DIAGNOSIS — I10 ESSENTIAL (PRIMARY) HYPERTENSION: ICD-10-CM

## 2025-07-28 DIAGNOSIS — I63.9 CEREBRAL INFARCTION, UNSPECIFIED: ICD-10-CM

## 2025-07-28 PROCEDURE — 99213 OFFICE O/P EST LOW 20 MIN: CPT

## 2025-08-07 ENCOUNTER — APPOINTMENT (OUTPATIENT)
Dept: CARDIOLOGY | Facility: CLINIC | Age: 65
End: 2025-08-07

## 2025-09-04 ENCOUNTER — APPOINTMENT (OUTPATIENT)
Dept: CARDIOLOGY | Facility: CLINIC | Age: 65
End: 2025-09-04